# Patient Record
Sex: MALE | Race: WHITE | Employment: OTHER | URBAN - METROPOLITAN AREA
[De-identification: names, ages, dates, MRNs, and addresses within clinical notes are randomized per-mention and may not be internally consistent; named-entity substitution may affect disease eponyms.]

---

## 2017-04-14 ENCOUNTER — HOSPITAL ENCOUNTER (INPATIENT)
Age: 57
LOS: 3 days | Discharge: HOME HEALTH | DRG: 247 | End: 2017-04-17
Attending: EMERGENCY MEDICINE | Admitting: SPECIALIST
Payer: COMMERCIAL

## 2017-04-14 DIAGNOSIS — I21.11 ST ELEVATION MYOCARDIAL INFARCTION INVOLVING RIGHT CORONARY ARTERY (HCC): Primary | ICD-10-CM

## 2017-04-14 PROBLEM — I21.3 STEMI (ST ELEVATION MYOCARDIAL INFARCTION) (HCC): Status: ACTIVE | Noted: 2017-04-14

## 2017-04-14 PROBLEM — I21.9 MYOCARDIAL INFARCTION (HCC): Status: ACTIVE | Noted: 2017-04-14

## 2017-04-14 LAB
ALBUMIN SERPL BCP-MCNC: 4 G/DL (ref 3.5–5)
ALBUMIN/GLOB SERPL: 1.1 {RATIO} (ref 1.1–2.2)
ALP SERPL-CCNC: 79 U/L (ref 45–117)
ALT SERPL-CCNC: 46 U/L (ref 12–78)
ANION GAP BLD CALC-SCNC: 12 MMOL/L (ref 5–15)
ANION GAP BLD CALC-SCNC: 25 MMOL/L (ref 5–15)
AST SERPL W P-5'-P-CCNC: 32 U/L (ref 15–37)
BASOPHILS # BLD AUTO: 0 K/UL (ref 0–0.1)
BASOPHILS # BLD: 0 % (ref 0–1)
BILIRUB SERPL-MCNC: 0.3 MG/DL (ref 0.2–1)
BUN BLD-MCNC: 14 MG/DL (ref 9–20)
BUN SERPL-MCNC: 13 MG/DL (ref 6–20)
BUN/CREAT SERPL: 7 (ref 12–20)
CA-I BLD-MCNC: 1.15 MMOL/L (ref 1.12–1.32)
CALCIUM SERPL-MCNC: 9.3 MG/DL (ref 8.5–10.1)
CHLORIDE BLD-SCNC: 105 MMOL/L (ref 98–107)
CHLORIDE SERPL-SCNC: 107 MMOL/L (ref 97–108)
CK MB CFR SERPL CALC: 1.4 % (ref 0–2.5)
CK MB SERPL-MCNC: 2.7 NG/ML (ref 5–25)
CK SERPL-CCNC: 192 U/L (ref 39–308)
CO2 BLD-SCNC: 17 MMOL/L (ref 21–32)
CO2 SERPL-SCNC: 19 MMOL/L (ref 21–32)
CREAT BLD-MCNC: 1.5 MG/DL (ref 0.6–1.3)
CREAT SERPL-MCNC: 1.76 MG/DL (ref 0.7–1.3)
EOSINOPHIL # BLD: 0.2 K/UL (ref 0–0.4)
EOSINOPHIL NFR BLD: 1 % (ref 0–7)
ERYTHROCYTE [DISTWIDTH] IN BLOOD BY AUTOMATED COUNT: 13.4 % (ref 11.5–14.5)
GLOBULIN SER CALC-MCNC: 3.6 G/DL (ref 2–4)
GLUCOSE BLD STRIP.AUTO-MCNC: 198 MG/DL (ref 65–100)
GLUCOSE BLD-MCNC: 177 MG/DL (ref 65–100)
GLUCOSE SERPL-MCNC: 177 MG/DL (ref 65–100)
HCT VFR BLD AUTO: 48.3 % (ref 36.6–50.3)
HCT VFR BLD CALC: 52 % (ref 36.6–50.3)
HGB BLD-MCNC: 16.8 G/DL (ref 12.1–17)
HGB BLD-MCNC: 17.7 GM/DL (ref 12.1–17)
LYMPHOCYTES # BLD AUTO: 32 % (ref 12–49)
LYMPHOCYTES # BLD: 4.1 K/UL (ref 0.8–3.5)
MCH RBC QN AUTO: 32.9 PG (ref 26–34)
MCHC RBC AUTO-ENTMCNC: 34.8 G/DL (ref 30–36.5)
MCV RBC AUTO: 94.7 FL (ref 80–99)
MONOCYTES # BLD: 1 K/UL (ref 0–1)
MONOCYTES NFR BLD AUTO: 7 % (ref 5–13)
NEUTS SEG # BLD: 7.8 K/UL (ref 1.8–8)
NEUTS SEG NFR BLD AUTO: 60 % (ref 32–75)
PLATELET # BLD AUTO: 246 K/UL (ref 150–400)
POTASSIUM BLD-SCNC: 3.6 MMOL/L (ref 3.5–5.1)
POTASSIUM SERPL-SCNC: 3.5 MMOL/L (ref 3.5–5.1)
PROT SERPL-MCNC: 7.6 G/DL (ref 6.4–8.2)
RBC # BLD AUTO: 5.1 M/UL (ref 4.1–5.7)
SERVICE CMNT-IMP: ABNORMAL
SERVICE CMNT-IMP: ABNORMAL
SODIUM BLD-SCNC: 142 MMOL/L (ref 136–145)
SODIUM SERPL-SCNC: 138 MMOL/L (ref 136–145)
TROPONIN I BLD-MCNC: <0.04 NG/ML (ref 0–0.08)
TROPONIN I SERPL-MCNC: <0.04 NG/ML
WBC # BLD AUTO: 13.1 K/UL (ref 4.1–11.1)

## 2017-04-14 PROCEDURE — 96375 TX/PRO/DX INJ NEW DRUG ADDON: CPT

## 2017-04-14 PROCEDURE — 96365 THER/PROPH/DIAG IV INF INIT: CPT

## 2017-04-14 PROCEDURE — 74011250637 HC RX REV CODE- 250/637: Performed by: SPECIALIST

## 2017-04-14 PROCEDURE — 74011250636 HC RX REV CODE- 250/636

## 2017-04-14 PROCEDURE — 5A1223Z PERFORMANCE OF CARDIAC PACING, CONTINUOUS: ICD-10-PCS | Performed by: SPECIALIST

## 2017-04-14 PROCEDURE — 36415 COLL VENOUS BLD VENIPUNCTURE: CPT | Performed by: EMERGENCY MEDICINE

## 2017-04-14 PROCEDURE — 93005 ELECTROCARDIOGRAM TRACING: CPT

## 2017-04-14 PROCEDURE — 96368 THER/DIAG CONCURRENT INF: CPT

## 2017-04-14 PROCEDURE — 96372 THER/PROPH/DIAG INJ SC/IM: CPT

## 2017-04-14 PROCEDURE — 84484 ASSAY OF TROPONIN QUANT: CPT | Performed by: EMERGENCY MEDICINE

## 2017-04-14 PROCEDURE — B2111ZZ FLUOROSCOPY OF MULTIPLE CORONARY ARTERIES USING LOW OSMOLAR CONTRAST: ICD-10-PCS | Performed by: SPECIALIST

## 2017-04-14 PROCEDURE — 74011000250 HC RX REV CODE- 250

## 2017-04-14 PROCEDURE — 74011636320 HC RX REV CODE- 636/320: Performed by: SPECIALIST

## 2017-04-14 PROCEDURE — 82962 GLUCOSE BLOOD TEST: CPT

## 2017-04-14 PROCEDURE — 74011250636 HC RX REV CODE- 250/636: Performed by: SPECIALIST

## 2017-04-14 PROCEDURE — 4A023N7 MEASUREMENT OF CARDIAC SAMPLING AND PRESSURE, LEFT HEART, PERCUTANEOUS APPROACH: ICD-10-PCS | Performed by: SPECIALIST

## 2017-04-14 PROCEDURE — B2151ZZ FLUOROSCOPY OF LEFT HEART USING LOW OSMOLAR CONTRAST: ICD-10-PCS | Performed by: SPECIALIST

## 2017-04-14 PROCEDURE — 74011250636 HC RX REV CODE- 250/636: Performed by: EMERGENCY MEDICINE

## 2017-04-14 PROCEDURE — 99285 EMERGENCY DEPT VISIT HI MDM: CPT

## 2017-04-14 PROCEDURE — 74011000258 HC RX REV CODE- 258: Performed by: SPECIALIST

## 2017-04-14 PROCEDURE — 92941 PRQ TRLML REVSC TOT OCCL AMI: CPT

## 2017-04-14 PROCEDURE — 82550 ASSAY OF CK (CPK): CPT | Performed by: EMERGENCY MEDICINE

## 2017-04-14 PROCEDURE — 80053 COMPREHEN METABOLIC PANEL: CPT | Performed by: EMERGENCY MEDICINE

## 2017-04-14 PROCEDURE — 80047 BASIC METABLC PNL IONIZED CA: CPT

## 2017-04-14 PROCEDURE — 74011000250 HC RX REV CODE- 250: Performed by: SPECIALIST

## 2017-04-14 PROCEDURE — 027035Z DILATION OF CORONARY ARTERY, ONE ARTERY WITH TWO DRUG-ELUTING INTRALUMINAL DEVICES, PERCUTANEOUS APPROACH: ICD-10-PCS | Performed by: SPECIALIST

## 2017-04-14 PROCEDURE — 96361 HYDRATE IV INFUSION ADD-ON: CPT

## 2017-04-14 PROCEDURE — 85025 COMPLETE CBC W/AUTO DIFF WBC: CPT | Performed by: EMERGENCY MEDICINE

## 2017-04-14 PROCEDURE — 65620000000 HC RM CCU GENERAL

## 2017-04-14 RX ORDER — SODIUM CHLORIDE 9 MG/ML
25-100 INJECTION, SOLUTION INTRAVENOUS CONTINUOUS
Status: DISCONTINUED | OUTPATIENT
Start: 2017-04-14 | End: 2017-04-14 | Stop reason: HOSPADM

## 2017-04-14 RX ORDER — ATORVASTATIN CALCIUM 10 MG/1
10 TABLET, FILM COATED ORAL
Status: DISCONTINUED | OUTPATIENT
Start: 2017-04-14 | End: 2017-04-17 | Stop reason: HOSPADM

## 2017-04-14 RX ORDER — OMEGA-3-ACID ETHYL ESTERS 1 G/1
2 CAPSULE, LIQUID FILLED ORAL 2 TIMES DAILY
COMMUNITY

## 2017-04-14 RX ORDER — FLUMAZENIL 0.1 MG/ML
INJECTION INTRAVENOUS
Status: COMPLETED
Start: 2017-04-14 | End: 2017-04-14

## 2017-04-14 RX ORDER — PRASUGREL 10 MG/1
10-60 TABLET, FILM COATED ORAL
Status: DISCONTINUED | OUTPATIENT
Start: 2017-04-14 | End: 2017-04-14 | Stop reason: HOSPADM

## 2017-04-14 RX ORDER — DOPAMINE HYDROCHLORIDE 320 MG/100ML
5-20 INJECTION, SOLUTION INTRAVENOUS
Status: DISCONTINUED | OUTPATIENT
Start: 2017-04-14 | End: 2017-04-17

## 2017-04-14 RX ORDER — METOPROLOL TARTRATE 25 MG/1
12.5 TABLET, FILM COATED ORAL 2 TIMES DAILY
Status: DISCONTINUED | OUTPATIENT
Start: 2017-04-15 | End: 2017-04-15

## 2017-04-14 RX ORDER — AMITRIPTYLINE HYDROCHLORIDE 75 MG/1
150 TABLET, FILM COATED ORAL
COMMUNITY

## 2017-04-14 RX ORDER — SODIUM CHLORIDE 0.9 % (FLUSH) 0.9 %
5-10 SYRINGE (ML) INJECTION AS NEEDED
Status: DISCONTINUED | OUTPATIENT
Start: 2017-04-14 | End: 2017-04-14 | Stop reason: HOSPADM

## 2017-04-14 RX ORDER — FENTANYL CITRATE 50 UG/ML
25-200 INJECTION, SOLUTION INTRAMUSCULAR; INTRAVENOUS
Status: DISCONTINUED | OUTPATIENT
Start: 2017-04-14 | End: 2017-04-14 | Stop reason: HOSPADM

## 2017-04-14 RX ORDER — MORPHINE SULFATE 2 MG/ML
INJECTION, SOLUTION INTRAMUSCULAR; INTRAVENOUS
Status: DISPENSED
Start: 2017-04-14 | End: 2017-04-15

## 2017-04-14 RX ORDER — ZOLPIDEM TARTRATE 5 MG/1
10 TABLET ORAL
Status: DISCONTINUED | OUTPATIENT
Start: 2017-04-14 | End: 2017-04-17 | Stop reason: HOSPADM

## 2017-04-14 RX ORDER — ACETAMINOPHEN 325 MG/1
650 TABLET ORAL
Status: DISCONTINUED | OUTPATIENT
Start: 2017-04-14 | End: 2017-04-17 | Stop reason: HOSPADM

## 2017-04-14 RX ORDER — LISINOPRIL 2.5 MG/1
2.5 TABLET ORAL DAILY
COMMUNITY
End: 2017-04-17

## 2017-04-14 RX ORDER — HEPARIN SODIUM 200 [USP'U]/100ML
2000 INJECTION, SOLUTION INTRAVENOUS AS NEEDED
Status: DISCONTINUED | OUTPATIENT
Start: 2017-04-14 | End: 2017-04-14 | Stop reason: HOSPADM

## 2017-04-14 RX ORDER — MIDAZOLAM HYDROCHLORIDE 1 MG/ML
.5-1 INJECTION, SOLUTION INTRAMUSCULAR; INTRAVENOUS
Status: DISCONTINUED | OUTPATIENT
Start: 2017-04-14 | End: 2017-04-14 | Stop reason: HOSPADM

## 2017-04-14 RX ORDER — METFORMIN HYDROCHLORIDE 1000 MG/1
1000 TABLET ORAL 2 TIMES DAILY WITH MEALS
COMMUNITY
End: 2017-04-17

## 2017-04-14 RX ORDER — INSULIN ASPART 100 [IU]/ML
10 INJECTION, SOLUTION INTRAVENOUS; SUBCUTANEOUS
COMMUNITY
End: 2019-10-31

## 2017-04-14 RX ORDER — GUAIFENESIN 100 MG/5ML
81 LIQUID (ML) ORAL DAILY
Status: DISCONTINUED | OUTPATIENT
Start: 2017-04-15 | End: 2017-04-17 | Stop reason: HOSPADM

## 2017-04-14 RX ORDER — SODIUM CHLORIDE 0.9 % (FLUSH) 0.9 %
5-10 SYRINGE (ML) INJECTION AS NEEDED
Status: DISCONTINUED | OUTPATIENT
Start: 2017-04-14 | End: 2017-04-17 | Stop reason: HOSPADM

## 2017-04-14 RX ORDER — LIDOCAINE HYDROCHLORIDE 10 MG/ML
10-30 INJECTION INFILTRATION; PERINEURAL
Status: DISCONTINUED | OUTPATIENT
Start: 2017-04-14 | End: 2017-04-14 | Stop reason: HOSPADM

## 2017-04-14 RX ORDER — DEXTROSE 50 % IN WATER (D50W) INTRAVENOUS SYRINGE
12.5-25 AS NEEDED
Status: DISCONTINUED | OUTPATIENT
Start: 2017-04-14 | End: 2017-04-17 | Stop reason: HOSPADM

## 2017-04-14 RX ORDER — PHENYLEPHRINE HCL IN 0.9% NACL 0.4MG/10ML
100-400 SYRINGE (ML) INTRAVENOUS ONCE
Status: COMPLETED | OUTPATIENT
Start: 2017-04-14 | End: 2017-04-14

## 2017-04-14 RX ORDER — SODIUM CHLORIDE 0.9 % (FLUSH) 0.9 %
5-10 SYRINGE (ML) INJECTION EVERY 8 HOURS
Status: DISCONTINUED | OUTPATIENT
Start: 2017-04-14 | End: 2017-04-17 | Stop reason: HOSPADM

## 2017-04-14 RX ORDER — HEPARIN SODIUM 1000 [USP'U]/ML
1000-5000 INJECTION, SOLUTION INTRAVENOUS; SUBCUTANEOUS
Status: DISCONTINUED | OUTPATIENT
Start: 2017-04-14 | End: 2017-04-14 | Stop reason: HOSPADM

## 2017-04-14 RX ORDER — PRASUGREL 10 MG/1
10 TABLET, FILM COATED ORAL DAILY
Status: DISCONTINUED | OUTPATIENT
Start: 2017-04-15 | End: 2017-04-17 | Stop reason: HOSPADM

## 2017-04-14 RX ORDER — EPTIFIBATIDE 0.75 MG/ML
2 INJECTION, SOLUTION INTRAVENOUS AS NEEDED
Status: DISCONTINUED | OUTPATIENT
Start: 2017-04-14 | End: 2017-04-14 | Stop reason: HOSPADM

## 2017-04-14 RX ORDER — FENOFIBRATE 200 MG/1
200 CAPSULE ORAL DAILY
COMMUNITY
End: 2017-04-17

## 2017-04-14 RX ORDER — SODIUM CHLORIDE 9 MG/ML
100 INJECTION, SOLUTION INTRAVENOUS CONTINUOUS
Status: DISPENSED | OUTPATIENT
Start: 2017-04-14 | End: 2017-04-15

## 2017-04-14 RX ORDER — INSULIN LISPRO 100 [IU]/ML
INJECTION, SOLUTION INTRAVENOUS; SUBCUTANEOUS
Status: DISCONTINUED | OUTPATIENT
Start: 2017-04-14 | End: 2017-04-17 | Stop reason: HOSPADM

## 2017-04-14 RX ORDER — ATROPINE SULFATE 0.1 MG/ML
.5-1 INJECTION INTRAVENOUS AS NEEDED
Status: DISCONTINUED | OUTPATIENT
Start: 2017-04-14 | End: 2017-04-14 | Stop reason: HOSPADM

## 2017-04-14 RX ORDER — MAGNESIUM SULFATE 100 %
4 CRYSTALS MISCELLANEOUS AS NEEDED
Status: DISCONTINUED | OUTPATIENT
Start: 2017-04-14 | End: 2017-04-17 | Stop reason: HOSPADM

## 2017-04-14 RX ORDER — FENTANYL CITRATE 50 UG/ML
50 INJECTION, SOLUTION INTRAMUSCULAR; INTRAVENOUS
Status: DISCONTINUED | OUTPATIENT
Start: 2017-04-14 | End: 2017-04-17 | Stop reason: HOSPADM

## 2017-04-14 RX ADMIN — ATORVASTATIN CALCIUM 10 MG: 10 TABLET, FILM COATED ORAL at 22:29

## 2017-04-14 RX ADMIN — SODIUM CHLORIDE 100 ML/HR: 900 INJECTION, SOLUTION INTRAVENOUS at 21:32

## 2017-04-14 RX ADMIN — FLUMAZENIL 5 MG: 0.1 INJECTION, SOLUTION INTRAVENOUS at 20:50

## 2017-04-14 RX ADMIN — EPTIFIBATIDE 2 MCG/KG/MIN: 0.75 INJECTION, SOLUTION INTRAVENOUS at 20:36

## 2017-04-14 RX ADMIN — MIDAZOLAM HYDROCHLORIDE 2 MG: 1 INJECTION, SOLUTION INTRAMUSCULAR; INTRAVENOUS at 20:03

## 2017-04-14 RX ADMIN — SODIUM CHLORIDE 4000 ML: 900 INJECTION, SOLUTION INTRAVENOUS at 19:20

## 2017-04-14 RX ADMIN — IOPAMIDOL 50 ML: 755 INJECTION, SOLUTION INTRAVENOUS at 20:18

## 2017-04-14 RX ADMIN — EPTIFIBATIDE 2 MCG/KG/MIN: 0.75 INJECTION, SOLUTION INTRAVENOUS at 20:24

## 2017-04-14 RX ADMIN — SODIUM CHLORIDE 25 ML/HR: 900 INJECTION, SOLUTION INTRAVENOUS at 21:31

## 2017-04-14 RX ADMIN — Medication 60 MG: at 20:52

## 2017-04-14 RX ADMIN — ATROPINE SULFATE 1 MG: 0.1 INJECTION, SOLUTION ENDOTRACHEAL; INTRAMUSCULAR; INTRAVENOUS; SUBCUTANEOUS at 19:57

## 2017-04-14 RX ADMIN — PHENYLEPHRINE HYDROCHLORIDE 200 MCG: 10 INJECTION, SOLUTION INTRAMUSCULAR; INTRAVENOUS; SUBCUTANEOUS at 20:15

## 2017-04-14 RX ADMIN — LIDOCAINE HYDROCHLORIDE 10 ML: 10 INJECTION, SOLUTION INFILTRATION; PERINEURAL at 20:01

## 2017-04-14 RX ADMIN — MIDAZOLAM HYDROCHLORIDE 2 MG: 1 INJECTION, SOLUTION INTRAMUSCULAR; INTRAVENOUS at 19:58

## 2017-04-14 RX ADMIN — Medication 10 ML: at 20:53

## 2017-04-14 RX ADMIN — DOPAMINE HYDROCHLORIDE IN DEXTROSE 18.56 MCG/KG/MIN: 3.2 INJECTION, SOLUTION INTRAVENOUS at 20:02

## 2017-04-14 RX ADMIN — FENTANYL CITRATE 50 MCG: 50 INJECTION, SOLUTION INTRAMUSCULAR; INTRAVENOUS at 20:53

## 2017-04-14 RX ADMIN — DOPAMINE HYDROCHLORIDE IN DEXTROSE 5 MCG/KG/MIN: 3.2 INJECTION, SOLUTION INTRAVENOUS at 19:38

## 2017-04-14 RX ADMIN — HEPARIN SODIUM IN SODIUM CHLORIDE 2000 UNITS: 200 INJECTION INTRAVENOUS at 20:51

## 2017-04-14 RX ADMIN — IOPAMIDOL 164 ML: 755 INJECTION, SOLUTION INTRAVENOUS at 20:56

## 2017-04-14 RX ADMIN — BIVALIRUDIN 150.85 MG/HR: 250 INJECTION, POWDER, LYOPHILIZED, FOR SOLUTION INTRAVENOUS at 20:16

## 2017-04-14 NOTE — ED PROVIDER NOTES
HPI Comments: 60 y.o. male with no significant past medical history who presents via EMS with chief complaint of chest pain. EMS personnel report picking up the patient from his home for a complaint of chest pain with onset at ~1830 \"this evening. \" They report giving the patient \"four\" Aspirin (81 mg) en route to the ED. They report the patient's systolic blood pressure was \"in the 60s. \" Patient reports chest pain, SOB, nausea, dizziness, shoulder pain, and arm pain with onset at ~1830. Patient reports his last meal was at ~1330 \"this afternoon. \" Patient reports working out at the gym and then going home; reports his symptoms began after going home. Patient denies being on Aspirin regularly. Patient denies any allergies to medications. Patient denies vomiting, numbness, and weakness. There are no other acute medical concerns at this time. PCP: Adam Schulz MD    Note written by Georgi Goodman, as dictated by Sabine Cruz MD 7:51 PM     The history is provided by the patient and the EMS personnel. No past medical history on file. No past surgical history on file. No family history on file. Social History     Social History    Marital status:      Spouse name: N/A    Number of children: N/A    Years of education: N/A     Occupational History    Not on file. Social History Main Topics    Smoking status: Not on file    Smokeless tobacco: Not on file    Alcohol use Not on file    Drug use: Not on file    Sexual activity: Not on file     Other Topics Concern    Not on file     Social History Narrative         ALLERGIES: Review of patient's allergies indicates not on file. Review of Systems   Constitutional: Negative for chills, diaphoresis and fever. HENT: Negative for congestion, postnasal drip, rhinorrhea and sore throat. Eyes: Negative for photophobia, discharge, redness and visual disturbance. Respiratory: Positive for shortness of breath.  Negative for cough, chest tightness and wheezing. Cardiovascular: Positive for chest pain. Negative for palpitations and leg swelling. Gastrointestinal: Positive for nausea. Negative for abdominal distention, abdominal pain, blood in stool, constipation, diarrhea and vomiting. Genitourinary: Negative for difficulty urinating, dysuria, frequency, hematuria and urgency. Musculoskeletal: Positive for myalgias. Negative for arthralgias, back pain and joint swelling. Skin: Negative for color change and rash. Neurological: Positive for dizziness. Negative for speech difficulty, weakness, light-headedness, numbness and headaches. Psychiatric/Behavioral: Negative for confusion. The patient is not nervous/anxious. All other systems reviewed and are negative. Vitals:    04/14/17 1933 04/14/17 1935 04/14/17 2048 04/14/17 2100   BP: 90/61 97/67 (!) 85/51 95/57   Pulse: (!) 42 (!) 41 (!) 106 (!) 104   Resp: 23 20 20 20   SpO2: (!) 69% 99% 97% 97%   Weight:       Height:                Physical Exam   Constitutional: He is oriented to person, place, and time. He appears well-developed and well-nourished. No distress. Appears ill; diaphoretic, but in no apparent distress. HENT:   Head: Normocephalic and atraumatic. Right Ear: External ear normal.   Left Ear: External ear normal.   Nose: Nose normal.   Mouth/Throat: Oropharynx is clear and moist.   Eyes: Conjunctivae and EOM are normal. Pupils are equal, round, and reactive to light. No scleral icterus. Neck: Normal range of motion. Neck supple. No JVD present. No tracheal deviation present. No thyromegaly present. Cardiovascular: Regular rhythm and normal heart sounds. Exam reveals no gallop and no friction rub. No murmur heard. Bradycardic. Pulmonary/Chest: Effort normal and breath sounds normal. No respiratory distress. He has no wheezes. He has no rales. He exhibits no tenderness. Abdominal: Soft.  Bowel sounds are normal. He exhibits no distension and no mass. There is no tenderness. There is no rebound and no guarding. Musculoskeletal: Normal range of motion. He exhibits no edema or tenderness. Lymphadenopathy:     He has no cervical adenopathy. Neurological: He is alert and oriented to person, place, and time. He has normal strength. He displays no atrophy and no tremor. No cranial nerve deficit. He exhibits normal muscle tone. Coordination and gait normal.   Skin: Skin is warm. No rash noted. He is diaphoretic. No erythema. Psychiatric: He has a normal mood and affect. His behavior is normal. Judgment and thought content normal.   Nursing note and vitals reviewed. Note written by Georgi Enamorado, as dictated by Navarro Phan MD 8:11 PM      MDM  Number of Diagnoses or Management Options  Diagnosis management comments: MICHELLE  Impression:  57-year-old male presenting to the emergency department with substernal chest pain and a STEMI alert from the field. I reviewed his electrocardiogram which shows an acute inferior wall infarct with ST segment elevation in leads V5 and V6, right-sided EKG was done which revealed ST segment elevation in V4 through V6 right-sided leads. The patient is hypotensive and bradycardic and indeed he is in a third degree heart block. Plan of care will be IV fluids, external pacemaker, we'll augment with dopamine, and await the cardiologist for the cardiac catheterization lab. Critical Care  Total time providing critical care: (Total critical care time spend exclusive of procedures: 45 minutes  )    ED Course     Procedures    ED EKG Interpretation:  HR in the 40s; ST segment elevation in the inferior leads and in V5 and V6. Note written by Georgi Enamorado, as dictated by Navarro Phan MD 8:13 PM    ED EKG Interpretation - Right-Sided:  ST segment elevation in leads V4, V5, and somewhat into V6.    Note written by Georgi Enamorado, as dictated by Navarro Phan MD 8:13 PM

## 2017-04-14 NOTE — ED NOTES
Able to get in touch with wife; notified her of patient's status and being in critical condition; she is on route to hospital at this time

## 2017-04-14 NOTE — IP AVS SNAPSHOT
Current Discharge Medication List  
  
START taking these medications Dose & Instructions Dispensing Information Comments Morning Noon Evening Bedtime  
 aspirin 81 mg chewable tablet Your last dose was: Your next dose is:    
   
   
 Dose:  81 mg Take 1 Tab by mouth daily. Quantity:  90 Tab Refills:  3  
     
   
   
   
  
 atorvastatin 10 mg tablet Commonly known as:  LIPITOR Your last dose was: Your next dose is:    
   
   
 Dose:  10 mg Take 1 Tab by mouth nightly. Quantity:  30 Tab Refills:  11  
     
   
   
   
  
 carvedilol 25 mg tablet Commonly known as:  Render Blonder Your last dose was: Your next dose is:    
   
   
 Dose:  25 mg Take 1 Tab by mouth two (2) times a day. Quantity:  60 Tab Refills:  11  
     
   
   
   
  
 lisinopril-hydroCHLOROthiazide 10-12.5 mg per tablet Commonly known as:  Mahnaz Maribell Your last dose was: Your next dose is:    
   
   
 Dose:  1 Tab Take 1 Tab by mouth daily. Quantity:  30 Tab Refills:  11  
     
   
   
   
  
 prasugrel 10 mg tablet Commonly known as:  EFFIENT Your last dose was: Your next dose is:    
   
   
 Dose:  10 mg Take 1 Tab by mouth daily. Quantity:  30 Tab Refills:  1 CONTINUE these medications which have NOT CHANGED Dose & Instructions Dispensing Information Comments Morning Noon Evening Bedtime  
 amitriptyline 75 mg tablet Commonly known as:  ELAVIL Your last dose was: Your next dose is:    
   
   
 Dose:  150 mg Take 150 mg by mouth nightly. Refills:  0 LEVEMIR FLEXPEN 100 unit/mL (3 mL) Inpn Generic drug:  insulin detemir Your last dose was: Your next dose is:    
   
   
 Dose:  40 Units 40 Units by SubCUTAneous route Daily (before breakfast). Refills:  0 NovoLOG Flexpen 100 unit/mL Inpn Generic drug:  insulin aspart Your last dose was: Your next dose is:    
   
   
 Dose:  8 Units 8 Units by SubCUTAneous route Before breakfast, lunch, and dinner. Refills:  0  
     
   
   
   
  
 omega-3 acid ethyl esters 1 gram capsule Commonly known as:  Remus Albe Your last dose was: Your next dose is:    
   
   
 Dose:  2 g Take 2 g by mouth two (2) times a day. Refills:  0 STOP taking these medications   
 fenofibrate micronized 200 mg capsule Commonly known as:  LOFIBRA  
   
  
 lisinopril 2.5 mg tablet Commonly known as:  PRINIVIL, ZESTRIL  
   
  
 metFORMIN 1,000 mg tablet Commonly known as:  GLUCOPHAGE Where to Get Your Medications These medications were sent to [place] [address], [address] [address] Phone:  [phone]  
  aspirin 81 mg chewable tablet  
 atorvastatin 10 mg tablet  
 carvedilol 25 mg tablet  
 lisinopril-hydroCHLOROthiazide 10-12.5 mg per tablet  
 prasugrel 10 mg tablet

## 2017-04-14 NOTE — PROGRESS NOTES
Cardiac Cath Lab Procedure Area Arrival Note:    Keven Dwyer arrived to Cardiac Cath Lab, Procedure Area. Patient identifiers verified with NAME and DATE OF BIRTH. Procedure verified with patient. Consent forms verified. Allergies verified. Patient informed of procedure and plan of care. Questions answered with review. Patient voiced understanding of procedure and plan of care. Patient on cardiac monitor, non-invasive blood pressure, SPO2 monitor. On O2 @ 10 lpm via 100%nrb . IV of normal saline on pump at 999 ml/hr. Patient status doing well without problems. Patient is A&Ox 4. Patient reports chest pain. Patient medicated during procedure with orders obtained and verified by Dr. Dorie Gonzalez. Refer to patients Cardiac Cath Lab PROCEDURE REPORT for vital signs, assessment, status, and response during procedure, printed at end of case. Printed report on chart or scanned into chart.

## 2017-04-14 NOTE — IP AVS SNAPSHOT
[address] 
[phone] Patient: Shawn Queen MRN: [id number] :1960 You are allergic to the following Allergen Reactions Fentanyl Anxiety Patient developed severe agitation and anxiety after administration during a cardiac cath. Recent Documentation Height Weight BMI Smoking Status 1.702 m 87.4 kg 30.18 kg/m2 Unknown If Ever Smoked Emergency Contacts Name Discharge Info Relation Home Work Mobile BreeSalma DISCHARGE CAREGIVER [3] Spouse [3] About your hospitalization You were admitted on:  2017 You last received care in the:  [facility] CV SERVICES UNIT You were discharged on:  2017 Unit phone number:  [phone] Why you were hospitalized Your primary diagnosis was:  Not on File Your diagnoses also included:  Myocardial Infarction (Hcc), Stemi (St Elevation Myocardial Infarction) (Hcc), Pericardial Effusion, Essential Hypertension Providers Seen During Your Hospitalizations Provider Role Specialty Primary office phone Linda Rock MD Attending Provider Emergency Medicine [phone] Karolyn Holguin MD Attending Provider Cardiology [phone] Your Primary Care Physician (PCP) Primary Care Physician Office Phone Office Fax Jonny Umana [phone] Follow-up Information Follow up With Details Comments Contact Info Smoking Cessation resource   please visit smoking cessation: 
 
[email or web address] Karolyn Holguin MD On 2017 2:40 pm [address] [place] [address] 
[phone] Kirk Beckham MD  Follow up for evaluation for TRIPP , incidental finding of left adrenal adenoma (benign) and post hospitalization followup [facility] [address] [place] [address] 
[phone] Your Appointments Friday April 21, 2017  2:40 PM EDT New Patient with Sylvia Grayson MD  
[facility] ([address]) [address]  [address] [place] [address]  
[phone] Current Discharge Medication List  
  
START taking these medications Dose & Instructions Dispensing Information Comments Morning Noon Evening Bedtime  
 aspirin 81 mg chewable tablet Your last dose was: Your next dose is:    
   
   
 Dose:  81 mg Take 1 Tab by mouth daily. Quantity:  90 Tab Refills:  3  
     
   
   
   
  
 atorvastatin 10 mg tablet Commonly known as:  LIPITOR Your last dose was: Your next dose is:    
   
   
 Dose:  10 mg Take 1 Tab by mouth nightly. Quantity:  30 Tab Refills:  11  
     
   
   
   
  
 carvedilol 25 mg tablet Commonly known as:  Esaw Math Your last dose was: Your next dose is:    
   
   
 Dose:  25 mg Take 1 Tab by mouth two (2) times a day. Quantity:  60 Tab Refills:  11  
     
   
   
   
  
 lisinopril-hydroCHLOROthiazide 10-12.5 mg per tablet Commonly known as:  Valplacido Rachelle Your last dose was: Your next dose is:    
   
   
 Dose:  1 Tab Take 1 Tab by mouth daily. Quantity:  30 Tab Refills:  11  
     
   
   
   
  
 prasugrel 10 mg tablet Commonly known as:  EFFIENT Your last dose was: Your next dose is:    
   
   
 Dose:  10 mg Take 1 Tab by mouth daily. Quantity:  30 Tab Refills:  1 CONTINUE these medications which have NOT CHANGED Dose & Instructions Dispensing Information Comments Morning Noon Evening Bedtime  
 amitriptyline 75 mg tablet Commonly known as:  ELAVIL Your last dose was: Your next dose is:    
   
   
 Dose:  150 mg Take 150 mg by mouth nightly. Refills:  0 LEVEMIR FLEXPEN 100 unit/mL (3 mL) Inpn Generic drug:  insulin detemir Your last dose was: Your next dose is:    
   
   
 Dose:  40 Units 40 Units by SubCUTAneous route Daily (before breakfast). Refills:  0 NovoLOG Flexpen 100 unit/mL Inpn Generic drug:  insulin aspart Your last dose was: Your next dose is:    
   
   
 Dose:  8 Units 8 Units by SubCUTAneous route Before breakfast, lunch, and dinner. Refills:  0  
     
   
   
   
  
 omega-3 acid ethyl esters 1 gram capsule Commonly known as:  Aixa Peter Your last dose was: Your next dose is:    
   
   
 Dose:  2 g Take 2 g by mouth two (2) times a day. Refills:  0 STOP taking these medications   
 fenofibrate micronized 200 mg capsule Commonly known as:  LOFIBRA  
   
  
 lisinopril 2.5 mg tablet Commonly known as:  PRINIVIL, ZESTRIL  
   
  
 metFORMIN 1,000 mg tablet Commonly known as:  GLUCOPHAGE Where to Get Your Medications These medications were sent to [place] [address], [address] [place] [id number] Phone:  [phone]  
  aspirin 81 mg chewable tablet  
 atorvastatin 10 mg tablet  
 carvedilol 25 mg tablet  
 lisinopril-hydroCHLOROthiazide 10-12.5 mg per tablet  
 prasugrel 10 mg tablet Discharge Instructions PLEASE DISCUSS with DR. Brennon Chang at NEXT visit- plan for duration of EFFIENT. Please do not stop taking EFFIENT without speaking to your cardiologist first.  Amarilys Diggs have been prescribed a blood thinner to prevent the development of blood clots. Please notify your cardiologist immediately if you notice blood in your urine or stool, have dark stools, have significant nosebleeds or notice any other unusual bleeding or bruising. Patient Discharge Instructions Ban Renae / [id number] : 1960 Admitted 4/14/2017 Discharged: 4/17/2017 CARDIAC CATHETERIZATION/ CATH STENT PLACEMENT  
DISCHARGE INSTRUCTIONS If possible, have someone stay with you for the first night. It is normal to feel tired for the first couple of days. Take it easy and follow your physicians instructions on activity. CHECK THE CATHETER INSERTION SITE DAILY: If bleeding at the cath site occurs, take a clean washcloth and apply direct pressure just above the puncture site for at least 15 minutes. Call 911 immediately if the bleeding is not controlled. Continue to apply direct pressure until an ambulance gets to your location. Do not try to drive yourself or have someone else drive you to the hospital.  Have the ambulance bring you to the emergency room. You may shower 24 hours after your procedure. Gently remove the bandage during showering. Wash with soap and water and pat dry. To prevent infection, keep the groin area/insertion site clean and dry. Do not apply powders, creams, lotions, or ointments to the site for 5 days. You may cover the site with a fresh Band-Aid each day until well healed. You may notice a small lump at the site. This is normal and may last up to 6 weeks. CALL THE PHYSICIAN: 
? If the site becomes red, swollen, or feels warm to the touch, or is healing poorly ? If you note any large/extending bruise, fever >101.0 or chills ? If your extremity has numbness, tingling, discoloration, abnormal swelling, tightness or pain ? If you have difficulty with urination or develop nausea, vomiting, or severe abdominal pain ACTIVITY for the first 24-48 hours, or as instructed by your physician: 
? No lifting, pushing or pulling over 10 pounds and no straining the insertion site. Do not lift grocery bags or the garbage can; do not run the vacuum  or  for 7 days. ? You may start walking short distances the day of your procedure. Gradually increase as tolerated each day. Current activity recommendations are 30 minutes of exercise at least 5 days a week. Work up to this as you recover. ? Avoid walking outside in extremes of heat or cold. Walk inside (at home, at the mall, or at a large store) when it is cold and windy or hot and humid. THINGS TO KEEP IN MIND:  
? Do not drive, operate any machinery, or sign any legal documents for 24 hours after your procedure, or as directed by your physician. You must have someone drive you home after your procedure. ? Drink plenty of fluids for 24-48 hours after your procedure to flush the contrast dye from your kidneys. ? Limit the number of times you go up and down the stairs ? Take rests and pace yourself with activity ? Be careful and do not strain with bowel movements MEDICATIONS: 
? Take all medications as prescribed ? Call your physician if you have any questions ? Keep an updated list of your medications with you at all times and give a list to your primary physician and pharmacist 
? You may use Tylenol 325mg 1-2 tablets every 6 hours as needed for pain or discomfort, unless otherwise instructed. If you have significant discomfort more than 48 hours after your procedure, please call your physicians office. SIGNS AND SYMPTOMS: 
? Notify your physician for new or recurrent symptoms of chest discomfort, unusual shortness of breath or fatigue. These could be signs of a problem and should be discussed with your physician. ? For significant chest pain or symptoms of angina not relieved with rest:  if you have been prescribed Nitroglycerin, take as directed (taken under the tongue, one at a time 5 minutes apart for a total of 3 doses, sitting down). If the discomfort is not relieved after the 3rd Nitroglycerin, call 911. If you have not been prescribed Nitroglycerin and your chest discomfort is significant, call 911.  Take the ambulance, do not try to drive yourself or have someone else drive you to the hospital.  
 
AFTER CARE: 
? Follow up with your physician as instructed ? Follow a heart healthy diet with proper portion control, daily stress management, daily exercise, blood pressure and cholesterol control, and smoking cessation. The success of your stent, if you had one placed, and the prevention of future catheterizations heavily depends on your lifestyle changes you make now! ? You may start walking short distances the day of your procedure. Gradually increase as tolerated each day. Current activity recommendations are 30 minutes of exercise at least 5 days a week. Work up to this as you recover. Walk, ride a bike, or choose any other activity you enjoy to reach this activity goal.  
? Avoid walking outside in extremes of heat or cold. Walk inside (at home, at the mall, or at a large store) when it is cold and windy or hot and humid. ? If you had a stent placed, consider Cardiac Wellness as a resource to help you make the needed lifestyle changes to live a heart healthy lifestyle. Discuss your candidacy with your physician. If you have questions, call your physicians office or the Cardiac Cath Lab at [phone]. The Cath Lab is operational from 6:30 a.m. to 5:00 p.m., Monday through Friday. After hours, notify your physician. [address] [place] [address] can be reached at [phone]. Cardiac Wellness is operational Monday-Thursday 8:30 a.m. to 5:00 p.m. and Friday 8:30 a.m. to 12:00 p.m. Remember:  IN CASE OF BLEEDING: KEEP FIRM PRESSURE ON THE PROCEDURE SITE AND CALL 911 IF NOT CONTROLLED Discharge Orders None [facility] Announcement We are excited to announce that we are making your provider's discharge notes available to you in [employer]. You will see these notes when they are completed and signed by the physician that discharged you from your recent hospital stay.   If you have any questions or concerns about any information you see in CallGrader, please call the Health Information Department where you were seen or reach out to your Primary Care Provider for more information about your plan of care. Introducing [facility]! Vinay Pedro introduces CallGrader patient portal. Now you can access parts of your medical record, email your doctor's office, and request medication refills online. 1. In your internet browser, go to [email or web address] "[email or web address] 2. Click on the First Time User? Click Here link in the Sign In box. You will see the New Member Sign Up page. 3. Enter your CallGrader Access Code exactly as it appears below. You will not need to use this code after youve completed the sign-up process. If you do not sign up before the expiration date, you must request a new code. · CallGrader Access Code: [email or web address] Expires: 7/16/2017 10:40 AM 
 
4. Enter the last four digits of your Social Security Number (xxxx) and Date of Birth (mm/dd/yyyy) as indicated and click Submit. You will be taken to the next sign-up page. 5. Create a CallGrader ID. This will be your CallGrader login ID and cannot be changed, so think of one that is secure and easy to remember. 6. Create a CallGrader password. You can change your password at any time. 7. Enter your Password Reset Question and Answer. This can be used at a later time if you forget your password. 8. Enter your e-mail address. You will receive e-mail notification when new information is available in [address] 9. Click Sign Up. You can now view and download portions of your medical record. 10. Click the Download Summary menu link to download a portable copy of your medical information. If you have questions, please visit the Frequently Asked Questions section of the CallGrader website. Remember, CallGrader is NOT to be used for urgent needs. For medical emergencies, dial 911. Now available from your iPhone and Android! General Information Please provide this summary of care documentation to your next provider. Patient Signature:  ____________________________________________________________ Date:  ____________________________________________________________  
  
Neita Hidden Provider Signature:  ____________________________________________________________ Date:  ____________________________________________________________

## 2017-04-15 ENCOUNTER — APPOINTMENT (OUTPATIENT)
Dept: CT IMAGING | Age: 57
DRG: 247 | End: 2017-04-15
Attending: SPECIALIST
Payer: COMMERCIAL

## 2017-04-15 PROBLEM — I31.39 PERICARDIAL EFFUSION: Status: ACTIVE | Noted: 2017-04-15

## 2017-04-15 LAB
ALBUMIN SERPL BCP-MCNC: 3.2 G/DL (ref 3.5–5)
ALBUMIN/GLOB SERPL: 1.1 {RATIO} (ref 1.1–2.2)
ALP SERPL-CCNC: 59 U/L (ref 45–117)
ALT SERPL-CCNC: 51 U/L (ref 12–78)
ANION GAP BLD CALC-SCNC: 10 MMOL/L (ref 5–15)
AST SERPL W P-5'-P-CCNC: 55 U/L (ref 15–37)
BILIRUB SERPL-MCNC: 0.4 MG/DL (ref 0.2–1)
BNP SERPL-MCNC: 39 PG/ML (ref 0–100)
BUN SERPL-MCNC: 9 MG/DL (ref 6–20)
BUN/CREAT SERPL: 8 (ref 12–20)
CALCIUM SERPL-MCNC: 8.1 MG/DL (ref 8.5–10.1)
CHLORIDE SERPL-SCNC: 109 MMOL/L (ref 97–108)
CHOLEST SERPL-MCNC: 131 MG/DL
CO2 SERPL-SCNC: 22 MMOL/L (ref 21–32)
CREAT SERPL-MCNC: 1.12 MG/DL (ref 0.7–1.3)
ERYTHROCYTE [DISTWIDTH] IN BLOOD BY AUTOMATED COUNT: 13.7 % (ref 11.5–14.5)
EST. AVERAGE GLUCOSE BLD GHB EST-MCNC: 166 MG/DL
GLOBULIN SER CALC-MCNC: 3 G/DL (ref 2–4)
GLUCOSE BLD STRIP.AUTO-MCNC: 110 MG/DL (ref 65–100)
GLUCOSE BLD STRIP.AUTO-MCNC: 140 MG/DL (ref 65–100)
GLUCOSE BLD STRIP.AUTO-MCNC: 161 MG/DL (ref 65–100)
GLUCOSE BLD STRIP.AUTO-MCNC: 99 MG/DL (ref 65–100)
GLUCOSE SERPL-MCNC: 161 MG/DL (ref 65–100)
HBA1C MFR BLD: 7.4 % (ref 4.2–6.3)
HCT VFR BLD AUTO: 41.2 % (ref 36.6–50.3)
HDLC SERPL-MCNC: 19 MG/DL
HDLC SERPL: 6.9 {RATIO} (ref 0–5)
HGB BLD-MCNC: 13.8 G/DL (ref 12.1–17)
LDLC SERPL CALC-MCNC: 70.8 MG/DL (ref 0–100)
LIPID PROFILE,FLP: ABNORMAL
MCH RBC QN AUTO: 31.6 PG (ref 26–34)
MCHC RBC AUTO-ENTMCNC: 33.5 G/DL (ref 30–36.5)
MCV RBC AUTO: 94.3 FL (ref 80–99)
PLATELET # BLD AUTO: 190 K/UL (ref 150–400)
POTASSIUM SERPL-SCNC: 3.7 MMOL/L (ref 3.5–5.1)
PROT SERPL-MCNC: 6.2 G/DL (ref 6.4–8.2)
RBC # BLD AUTO: 4.37 M/UL (ref 4.1–5.7)
SERVICE CMNT-IMP: ABNORMAL
SERVICE CMNT-IMP: NORMAL
SODIUM SERPL-SCNC: 141 MMOL/L (ref 136–145)
TRIGL SERPL-MCNC: 206 MG/DL (ref ?–150)
TROPONIN I SERPL-MCNC: 6.08 NG/ML
VLDLC SERPL CALC-MCNC: 41.2 MG/DL
WBC # BLD AUTO: 14.8 K/UL (ref 4.1–11.1)

## 2017-04-15 PROCEDURE — 65660000000 HC RM CCU STEPDOWN

## 2017-04-15 PROCEDURE — 74011636637 HC RX REV CODE- 636/637: Performed by: INTERNAL MEDICINE

## 2017-04-15 PROCEDURE — 74011636637 HC RX REV CODE- 636/637: Performed by: SPECIALIST

## 2017-04-15 PROCEDURE — 36415 COLL VENOUS BLD VENIPUNCTURE: CPT | Performed by: SPECIALIST

## 2017-04-15 PROCEDURE — 83880 ASSAY OF NATRIURETIC PEPTIDE: CPT | Performed by: SPECIALIST

## 2017-04-15 PROCEDURE — 71250 CT THORAX DX C-: CPT

## 2017-04-15 PROCEDURE — 74011250637 HC RX REV CODE- 250/637: Performed by: INTERNAL MEDICINE

## 2017-04-15 PROCEDURE — 84484 ASSAY OF TROPONIN QUANT: CPT | Performed by: SPECIALIST

## 2017-04-15 PROCEDURE — 74011250637 HC RX REV CODE- 250/637: Performed by: NURSE PRACTITIONER

## 2017-04-15 PROCEDURE — 93005 ELECTROCARDIOGRAM TRACING: CPT

## 2017-04-15 PROCEDURE — 82962 GLUCOSE BLOOD TEST: CPT

## 2017-04-15 PROCEDURE — 74011250637 HC RX REV CODE- 250/637: Performed by: SPECIALIST

## 2017-04-15 PROCEDURE — 80061 LIPID PANEL: CPT | Performed by: SPECIALIST

## 2017-04-15 PROCEDURE — 93306 TTE W/DOPPLER COMPLETE: CPT

## 2017-04-15 PROCEDURE — 83036 HEMOGLOBIN GLYCOSYLATED A1C: CPT | Performed by: INTERNAL MEDICINE

## 2017-04-15 PROCEDURE — 85027 COMPLETE CBC AUTOMATED: CPT | Performed by: SPECIALIST

## 2017-04-15 PROCEDURE — 80053 COMPREHEN METABOLIC PANEL: CPT | Performed by: SPECIALIST

## 2017-04-15 RX ORDER — METOPROLOL TARTRATE 25 MG/1
25 TABLET, FILM COATED ORAL 2 TIMES DAILY
Status: DISCONTINUED | OUTPATIENT
Start: 2017-04-15 | End: 2017-04-15

## 2017-04-15 RX ORDER — AMITRIPTYLINE HYDROCHLORIDE 50 MG/1
150 TABLET, FILM COATED ORAL 2 TIMES DAILY
Status: DISCONTINUED | OUTPATIENT
Start: 2017-04-15 | End: 2017-04-15

## 2017-04-15 RX ORDER — INSULIN GLARGINE 100 [IU]/ML
40 INJECTION, SOLUTION SUBCUTANEOUS DAILY
Status: DISCONTINUED | OUTPATIENT
Start: 2017-04-15 | End: 2017-04-17 | Stop reason: HOSPADM

## 2017-04-15 RX ORDER — LISINOPRIL 5 MG/1
5 TABLET ORAL DAILY
Status: DISCONTINUED | OUTPATIENT
Start: 2017-04-15 | End: 2017-04-16

## 2017-04-15 RX ORDER — IBUPROFEN 200 MG
1 TABLET ORAL DAILY
Status: DISCONTINUED | OUTPATIENT
Start: 2017-04-15 | End: 2017-04-17 | Stop reason: HOSPADM

## 2017-04-15 RX ORDER — CARVEDILOL 6.25 MG/1
6.25 TABLET ORAL 2 TIMES DAILY
Status: DISCONTINUED | OUTPATIENT
Start: 2017-04-15 | End: 2017-04-16

## 2017-04-15 RX ORDER — AMITRIPTYLINE HYDROCHLORIDE 50 MG/1
150 TABLET, FILM COATED ORAL
Status: DISCONTINUED | OUTPATIENT
Start: 2017-04-15 | End: 2017-04-17 | Stop reason: HOSPADM

## 2017-04-15 RX ORDER — LORAZEPAM 0.5 MG/1
0.5 TABLET ORAL
Status: DISCONTINUED | OUTPATIENT
Start: 2017-04-15 | End: 2017-04-17 | Stop reason: HOSPADM

## 2017-04-15 RX ADMIN — PRASUGREL HYDROCHLORIDE 10 MG: 10 TABLET, FILM COATED ORAL at 08:53

## 2017-04-15 RX ADMIN — ASPIRIN 81 MG CHEWABLE TABLET 81 MG: 81 TABLET CHEWABLE at 08:19

## 2017-04-15 RX ADMIN — Medication 10 ML: at 21:53

## 2017-04-15 RX ADMIN — INSULIN GLARGINE 40 UNITS: 100 INJECTION, SOLUTION SUBCUTANEOUS at 08:53

## 2017-04-15 RX ADMIN — ATORVASTATIN CALCIUM 10 MG: 10 TABLET, FILM COATED ORAL at 21:51

## 2017-04-15 RX ADMIN — METOPROLOL TARTRATE 12.5 MG: 25 TABLET ORAL at 08:52

## 2017-04-15 RX ADMIN — INSULIN LISPRO 2 UNITS: 100 INJECTION, SOLUTION INTRAVENOUS; SUBCUTANEOUS at 12:01

## 2017-04-15 RX ADMIN — AMITRIPTYLINE HYDROCHLORIDE 75 MG: 50 TABLET, FILM COATED ORAL at 01:42

## 2017-04-15 RX ADMIN — LISINOPRIL 5 MG: 5 TABLET ORAL at 10:46

## 2017-04-15 RX ADMIN — AMITRIPTYLINE HYDROCHLORIDE 150 MG: 50 TABLET, FILM COATED ORAL at 21:51

## 2017-04-15 RX ADMIN — Medication 10 ML: at 14:39

## 2017-04-15 RX ADMIN — CARVEDILOL 6.25 MG: 6.25 TABLET, FILM COATED ORAL at 17:20

## 2017-04-15 RX ADMIN — CARVEDILOL 6.25 MG: 6.25 TABLET, FILM COATED ORAL at 10:46

## 2017-04-15 NOTE — CARDIO/PULMONARY
Cardiac Wellness: MI education with catheterization brochure and smoking cessation education information given to Susi Gongora. Daughter at bedside. Educated using teach back method. Reviewed MI diagnosis definition and purpose of intervention. He has a history of hypertension and diabetes. Reviewed importance of medication compliance and follow up appointments with cardiologist.  Discussed purpose of effient and metoprolol and potential side effects. Smoking history assessed. Patient is a  smoker. Reviewed physiological effects of smoking on cardiovascular system. Discussed making a plan for quitting, website and telephone resources and support systems. Encouraged patient to ask physician about pharmaceutical options. Encouraged verbalization about emotional challenges and offered potential solutions. He has the nicotine patch on now but may go back on chantix. Wife entered room as this nurse was leaving so reviewed all info with her. Emphasized value of cardiac rehab, discussed Cardiac Wellness Program and encouraged enrollment. He is interested. Will follow up with patient by phone after discharge for participation. Susi Gongora verbalized understanding with questions answered.

## 2017-04-15 NOTE — PROGRESS NOTES
Hospitalist Progress Note  Magaly José MD  Office: [phone]  Cell:       Date of Service:  4/15/2017  NAME:  Sam Dorado  :  1960  MRN:  [id number]      Admission Summary: This is a 58-year-old man with a   past medical history significant for type 2 diabetes, suspected   obstructive sleep apnea, and hypertension, who was in his usual state   of health until the day of presentation at the emergency room, when   the patient developed chest pain. The chest pain was located at the left   side of the chest, associated with diaphoresis, as well as shortness of   breath. The chest pain is with radiation to the left shoulder. It is 8/10   in severity. There are no known aggravating or alleviating factors. The   patient was brought to the emergency room for further evaluation. When the patient arrived at the emergency room, he was diagnosed   with ST elevation myocardial infarction (STEMI). The patient was taken   straight to the cath lab, where the patient subsequently underwent   stent placement. Medical consult was requested by Dr. Kamari Calderon   for medical management of the patient's diabetes. The patient has   had no prior episodes of coronary artery disease. Interval history / Subjective:    Pt is due to re start home levimir 40 units this morning. He states he has a good appetite and thinks he can resume eating as normal.   At home he also takes 4 units of insulin with each meal, and is aware of plan to use ssi while here. Assessment & Plan: This is a 58-year-old man who presented in the   emergency room with chest pain, who was diagnosed with an ST   elevation myocardial infarction, taken to the cath lab, and underwent   cardiac stent placement. Medical consult was requested for medical   management of the patient's diabetes, as well as other medical   problems.  The management of this patient's medical problems are as   stated above    1. ST elevation myocardial infarction. 2. Type 2 diabetes with hyperglycemia. 3. Tobacco abuse. 4. Suspected obstructive sleep apnea. 5. Hypertension. 6. Acute renal failure.     PLAN   1. ST elevation myocardial infarction. The patient will continue   treatment as per cardiologist.   2. Type 2 diabetes. We will resume basal insulin. The patient will also   be placed on sliding scale with insulin coverage. Hemoglobin A1c is 7.4 on 4/14/17. We will hold oral agents to the time of discharge  from the hospital.   3. Tobacco abuse. The patient is advised to quit smoking. We will   place the patient on a Nicoderm patch. 4. Suspected obstructive sleep apnea. The patient is advised to follow   up with his primary care physician with   subsequent referral to pulmonologist for evaluation for sleep studies. 5. Hypertension. We will resume preadmission medication. We will   monitor the patient's blood pressure closely. 6. Acute renal failure. This is most likely due to volume depletion. We will carry out hydration with normal saline and monitor the patient's   renal function.     .       Code status: full  DVT prophylaxis: Not indicated. Care Plan discussed with: Patient/Family and Nurse  Disposition: TBD     Hospital Problems  Never Reviewed          Codes Class Noted POA    Myocardial infarction [facility]) ICD-10-CM: I21.3  ICD-9-CM: 410.90  4/14/2017 Unknown        STEMI (ST elevation myocardial infarction) [facility]) ICD-10-CM: I21.3  ICD-9-CM: 410.90  4/14/2017 Unknown                Review of Systems:   A comprehensive review of systems was negative except for that written in the HPI. Vital Signs:    Last 24hrs VS reviewed since prior progress note.  Most recent are:  Visit Vitals    /78    Pulse (!) 101    Temp 98.3 °F (36.8 °C)    Resp 20    Ht 5' 7\" (1.702 m)    Wt 92.2 kg (203 lb 4.2 oz)    SpO2 95%    BMI 31.84 kg/m2         Intake/Output Summary (Last 24 hours) at 04/15/17 0804  Last data filed at 04/15/17 0800   Gross per 24 hour   Intake           846.67 ml   Output             2530 ml   Net         -1683.33 ml        Physical Examination:          Constitutional:  No acute distress, cooperative, pleasant    ENT:  Oral mucous moist, oropharynx benign. Neck supple,   Eyes: pupils appear round, and bilaterally equal and symetrical.     Resp:  CTA bilaterally. No wheezing/rhonchi/rales. No accessory muscle use   CV:  Regular rhythm, normal rate, no murmurs, gallops, rubs    GI:  Soft, non distended, non tender. normoactive bowel sounds, no hepatosplenomegaly     Musculoskeletal:  No edema, warm, 2+ pulses throughout    Neurologic:  Moves all extremities. AAOx3, CN II-XII reviewed  Psych:  Normal speech and affect. Data Review:    Review and/or order of clinical lab test      Labs:     Recent Labs      04/15/17   0407  04/14/17   1925   WBC  14.8*  13.1*   HGB  13.8  16.8   HCT  41.2  48.3   PLT  190  246     Recent Labs      04/15/17   0407  04/14/17   1925   NA  141  138   K  3.7  3.5   CL  109*  107   CO2  22  19*   BUN  9  13   CREA  1.12  1.76*   GLU  161*  177*   CA  8.1*  9.3     Recent Labs      04/15/17   0407  04/14/17   1925   SGOT  55*  32   ALT  51  46   AP  59  79   TBILI  0.4  0.3   TP  6.2*  7.6   ALB  3.2*  4.0   GLOB  3.0  3.6     No results for input(s): INR, PTP, APTT in the last 72 hours. No lab exists for component: INREXT   No results for input(s): FE, TIBC, PSAT, FERR in the last 72 hours. No results found for: FOL, RBCF   No results for input(s): PH, PCO2, PO2 in the last 72 hours.   Recent Labs      04/15/17   0407  04/14/17   1925   CPK   --   192   CKNDX   --   1.4   TROIQ  6.08*  <0.04     Lab Results   Component Value Date/Time    Cholesterol, total 131 04/15/2017 04:07 AM    HDL Cholesterol 19 04/15/2017 04:07 AM    LDL, calculated 70.8 04/15/2017 04:07 AM    Triglyceride 206 04/15/2017 04:07 AM    CHOL/HDL Ratio 6.9 04/15/2017 04:07 AM     Lab Results   Component Value Date/Time    Glucose (POC) 198 04/14/2017 09:34 PM    Glucose (POC) 177 04/14/2017 07:25 PM     No results found for: COLOR, APPRN, SPGRU, REFSG, NAHED, PROTU, GLUCU, Anne Marie Rasp, BILU, Maricruz Haver, SHEILA, Yanique Caldron, GLUKE, EPSU, BACTU, WBCU, RBCU, CASTS, UCRY      ______________________________________________________________________  EXPECTED LENGTH OF STAY: - - -  ACTUAL LENGTH OF STAY:           1 Days               Parmjit Ching MD

## 2017-04-15 NOTE — PROCEDURES
Cardiac Catheterization Procedure Note   Patient: Keven Dwyer  MRN: [id number]  SSN: [id number]   YOB: 1960 Age: 60 y.o. Sex: male    Date of Procedure: 4/14/2017   Pre-procedure Diagnosis: STEMI  Post-procedure Diagnosis: Coronary Artery Disease  Procedure: Left Heart Cath, PCI and temporary pacemaker insertion  :  Dr. Jana Damon MD    Assistant(s):  None  Anesthesia: Moderate Sedation   Estimated Blood Loss: Less than 10 mL   Specimens Removed: None  Findings: 100% proximal to mid occlusion large RCA, treated with 3.5 x 18 and 3.5 x 15 Xience Alpine stents good result. LVEF 75% no WMA. LVEDP 20. Initially HR was 34 hypotensive, so temporary pacemaker inserted via right femoral vein. Integrilin and Angiomax given during case, Angioseal to groin.   Complications: None   Implants:  None  Signed by:  Jana Damon MD  4/14/2017  9:03 PM

## 2017-04-15 NOTE — CONSULTS
[address]   [address] [facility], [address] [place] [address] Ave   [facility]       Name:  Linda Macdonald   MR#:  [id number]   :  1960   Account #:  [de-identified]    Date of Consultation:  2017   Date of Adm:  2017       PRIMARY CARE PHYSICIAN: Dr. Paul Nash    REQUESTING PHYSICIAN: Dr. Evgeny Carlisle/Cardiology    REASON FOR CONSULTATION: Medical management of diabetes. SOURCE OF INFORMATION: The patient. CHIEF COMPLAINT: Chest pain. HISTORY OF PRESENT ILLNESS: This is a 53-year-old man with a   past medical history significant for type 2 diabetes, suspected   obstructive sleep apnea, and hypertension, who was in his usual state   of health until the day of presentation at the emergency room, when   the patient developed chest pain. The chest pain was located at the left   side of the chest, associated with diaphoresis, as well as shortness of   breath. The chest pain is with radiation to the left shoulder. It is 8/10   in severity. There are no known aggravating or alleviating factors. The   patient was brought to the emergency room for further evaluation. When the patient arrived at the emergency room, he was diagnosed   with ST elevation myocardial infarction (STEMI). The patient was taken   straight to the cath lab, where the patient subsequently underwent   stent placement. Medical consult was requested by Dr. Saint Rape   for medical management of the patient's diabetes. The patient has   had no prior episodes of coronary artery disease. PAST MEDICAL HISTORY: Type 2 diabetes, suspected obstructive   sleep apnea, hypertension. ALLERGIES: NO KNOWN DRUG ALLERGIES. MEDICATIONS   1. Lisinopril 2.5 mg daily. 2. Glucophage 1000 mg twice daily. 3. Elavil 150 mg daily at bedtime. 4. Lofibra 200 mg daily. 5. Levemir 40 units subcutaneously daily. 6. Sliding scale with insulin coverage. FAMILY HISTORY: This was reviewed.  His mother and father have   heart disease. PAST SURGICAL HISTORY: Not significant. SOCIAL HISTORY: No history of alcohol or tobacco abuse. REVIEW OF SYSTEMS   HEAD, EYES, EARS, NOSE AND THROAT: This is positive for   dizziness. No blurring of vision, no photophobia. RESPIRATORY: This is positive for shortness of breath. No cough, no   hemoptysis. CARDIOVASCULAR: This is positive for chest pain. No orthopnea, no   palpitation. GASTROINTESTINAL: This is positive for nausea. No vomiting, no   diarrhea, no constipation. GENITOURINARY: No dysuria, no urgency, and no frequency. All other systems are reviewed and they are negative. PHYSICAL EXAMINATION   GENERAL APPEARANCE: The patient appeared ill, in moderate   distress. VITAL SIGNS: Temperature not available, pulse 100, blood pressure   118/76, respiratory rate 18. Oxygen saturation 98%. HEAD: Normocephalic, atraumatic. EYES: Normal eye movements. No redness, no drainage. EARS: Normal external ears with no nasal drainage. NOSE: No deformities. No drainage. MOUTH AND THROAT: No visible oral lesion. NECK: Supple. No JVD, no thyromegaly. CHEST: Clear breath sounds. No wheezing, no crackles. HEART: Normal S1 and S2, regular. No clinically appreciable murmur. ABDOMEN: Soft, nontender, normal bowel sounds. CENTRAL NERVOUS SYSTEM: Alert, oriented x3. No gross   focal neurological deficits. EXTREMITIES: No edema. Pulses 2+ bilaterally. MUSCULOSKELETAL: No obvious joint deformity or swelling. SKIN: No active skin lesions seen in the exposed parts of the body. PSYCHIATRIC: Normal mood and affect. LYMPHATIC SYSTEM: No cervical lymphadenopathy. DIAGNOSTIC DATA: None. LABORATORY DATA: Chemistry: Sodium 138, potassium 3.5,   chloride 107, CO2 19, glucose 177, BUN 13, creatinine 1.76, calcium   9.3, bilirubin total 0.3, total protein 7.6, albumin level 4.0, globulin at   3.6, ALT 46, AST 32, alkaline phosphatase 79.  Hematology: WBC   13.1, hemoglobin 16.8, hematocrit 48.3, platelets at 206. ASSESSMENT   1. ST elevation myocardial infarction. 2. Type 2 diabetes with hyperglycemia. 3. Tobacco abuse. 4. Suspected obstructive sleep apnea. 5. Hypertension. 6. Acute renal failure. PLAN   1. ST elevation myocardial infarction. The patient will continue   treatment as per cardiologist.   2. Type 2 diabetes. We will resume basal insulin. The patient will also   be placed on sliding scale with insulin coverage. Will check   hemoglobin A1c level. We will hold oral agents to the time of discharge   from the hospital.   3. Tobacco abuse. The patient is advised to quit smoking. We will   place the patient on a Nicoderm patch. 4. Suspected obstructive sleep apnea. The patient is advised to follow   up with his primary care physician with   subsequent referral to pulmonologist for evaluation for sleep studies. 5. Hypertension. We will resume preadmission medication. We will   monitor the patient's blood pressure closely. 6. Acute renal failure. This is most likely due to volume depletion. We will carry out hydration with normal saline and monitor the patient's   renal function. SUMMARY: This is a 57-year-old man who presented in the   emergency room with chest pain, who was diagnosed with an ST   elevation myocardial infarction, taken to the cath lab, and underwent   cardiac stent placement. Medical consult was requested for medical   management of the patient's diabetes, as well as other medical   problems. The management of this patient's medical problems are as   stated above. Thank you for involving the hospitalist service in the care of this   patient. We will continue to follow the patient along with the cardiology   group. Less than 1 hour was spent on this medical consult.         MD ROBERTO Ferguson / HARLAN   D:  04/15/2017   02:02   T:  04/15/2017   04:28   Job #:  [id number]
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
## 2017-04-15 NOTE — PROGRESS NOTES
[phone] - received to [facility] from CCU, tele in place and verified with monitor tech, VSS, assessment completed.

## 2017-04-15 NOTE — PROGRESS NOTES
Spiritual Care Assessment/Progress Notes    Shawn Queen [id number]  [id number]    1960  60 y.o.  male    Patient Telephone Number: [phone] (home)   Restorationism Affiliation: Non Roman Catholic   Language: English   Extended Emergency Contact Information  Primary Emergency Contact: EdgarKimberSalma Ling  Address: [address] [place] [address] Phone: [phone]  Relation: Spouse   Patient Active Problem List    Diagnosis Date Noted    Myocardial infarction [facility]) 04/14/2017    STEMI (ST elevation myocardial infarction) ([facility] [address] 04/14/2017        Date: 4/15/2017       Level of Restorationism/Spiritual Activity:  []         Involved in carley tradition/spiritual practice    []         Not involved in carley tradition/spiritual practice  []         Spiritually oriented    []         Claims no spiritual orientation    []         seeking spiritual identity  []         Feels alienated from Sabianist practice/tradition  []         Feels angry about Sabianist practice/tradition  []         Spirituality/Sabianist tradition may be a resource for coping at this time.   [x]         Not able to assess due to medical condition    Services Provided Today:  []         crisis intervention    []         reading Scriptures  []         spiritual assessment    []         prayer  []         empathic listening/emotional support  []         rites and rituals (cite in comments)  []         life review     []         Sabianist support  []         theological development   []         advocacy  []         ethical dialog     []         blessing  []         bereavement support    []         support to family  []         anticipatory grief support   []         help with AMD  []         spiritual guidance    []         meditation      Spiritual Care Needs  []         Emotional Support  []         Spiritual/Restorationism Care  []         Loss/Adjustment  []         Advocacy/Referral /Ethics  []         No needs expressed at               this time  []         Other: (note in               comments)  [address]  []         Follow up visits with               pt/family  []         Provide materials  []         Schedule sacraments  []         Contact Community               Clergy  []         Follow up as needed  []         Other: (note in               comments)     Responded to page for this pt in [address]. Pt was actively being cared for and no family was present. [facility] consulted staff and was informed that family was en route, but an ETA was not offered. No pastoral care needs at this time. Please contact Spiritual Care services for any emotional or spiritual needs.     Adam Moran, Staff   Please call [phone] to page  if needed

## 2017-04-15 NOTE — ED TRIAGE NOTES
Triage note: Pt arrived via EMS from home as a Code STEMI. Pt is pale and diaphoretic upon arrival with SBP in the 60's. Pt is A&Ox 4 complaining of left sided CP. Pt was at the gym and came home with diaphoresis and CP.

## 2017-04-15 NOTE — PROGRESS NOTES
Problem: AMI: Day 1  Goal: Activity/Safety  Outcome: Progressing Towards Goal  Up with assistance  Goal: Consults, if ordered  Outcome: Progressing Towards Goal  Hospitalist consult for Diabetic Treatment  Goal: Nutrition/Diet  Outcome: Progressing Towards Goal  Diabetic Diet  Goal: Medications  Outcome: Progressing Towards Goal  Metoprolol  ASA  Effient        Problem: Falls - Risk of  Goal: *Absence of falls  Outcome: Progressing Towards Goal  Calls for assistance appropriately  Slip resistant footwear  Clutter free room  Up with assistance only  Bedside commode  Family at the bedside  Q1 hour safety checks      Goal: *Knowledge of fall prevention  Calls for assistance appropriately  Slip resistant footwear  Clutter free room  Up with assistance only  Bedside commode  Family at the bedside  Q1 hour safety checks

## 2017-04-15 NOTE — PROGRESS NOTES
Cardiology Progress Note  4/15/2017 1:00 PM    Admit Date: 4/14/2017    Admit Diagnosis: Myocardial infarction (HCC);STEMI (ST elevation myocardial *      Assessment:     Active Problems:    Myocardial infarction ([facility] (4/14/2017)      STEMI (ST elevation myocardial infarction) ([facility] (4/14/2017)      Pericardial effusion (4/15/2017)        Plan:     Acute Myocardial Infarction  improved   Increase medical therapy, move to floor. Echo shows normal LV function, but apparent pericardial effusion and thickened pericardium, seems unusual.  Given smoking history, will get chest CT. Abraham Miller MD  [phone]  Cell        Subjective:     Handy Yen denies chest pressure/discomfort. He reports symptoms are improved since yesterday. ROS: negative except as noted above.     Objective:       Visit Vitals    /90 (BP 1 Location: Right arm, BP Patient Position: At rest)    Pulse 85    Temp 98.1 °F (36.7 °C)    Resp 17    Ht 5' 7\" (1.702 m)    Wt 92.2 kg (203 lb 4.2 oz)    SpO2 99%    BMI 31.84 kg/m2       Current Facility-Administered Medications   Medication Dose Route Frequency    amitriptyline (ELAVIL) tablet 150 mg  150 mg Oral QHS    insulin glargine (LANTUS) injection 40 Units  40 Units SubCUTAneous DAILY    nicotine (NICODERM CQ) 21 mg/24 hr patch 1 Patch  1 Patch TransDERmal DAILY    carvedilol (COREG) tablet 6.25 mg  6.25 mg Oral BID    lisinopril (PRINIVIL, ZESTRIL) tablet 5 mg  5 mg Oral DAILY    LORazepam (ATIVAN) tablet 0.5 mg  0.5 mg Oral Q6H PRN    DOPamine (INTROPIN) 800 mg/250 mL (3,200 mcg/mL) infusion  5-20 mcg/kg/min IntraVENous TITRATE    DOPamine (INTROPIN) 800 mg/250 mL (3,200 mcg/mL) infusion  5-20 mcg/kg/min IntraVENous TITRATE    sodium chloride (NS) flush 5-10 mL  5-10 mL IntraVENous Q8H    sodium chloride (NS) flush 5-10 mL  5-10 mL IntraVENous PRN    acetaminophen (TYLENOL) tablet 650 mg  650 mg Oral Q4H PRN    atorvastatin (LIPITOR) tablet 10 mg 10 mg Oral QHS    prasugrel (EFFIENT) tablet 10 mg  10 mg Oral DAILY    aspirin chewable tablet 81 mg  81 mg Oral DAILY    insulin lispro (HUMALOG) injection   SubCUTAneous AC&HS    glucose chewable tablet 16 g  4 Tab Oral PRN    dextrose (D50W) injection syrg 12.5-25 g  12.5-25 g IntraVENous PRN    glucagon (GLUCAGEN) injection 1 mg  1 mg IntraMUSCular PRN    fentaNYL citrate (PF) injection 50 mcg  50 mcg IntraVENous Q4H PRN    zolpidem (AMBIEN) tablet 10 mg  10 mg Oral QHS PRN         Physical Exam:  Visit Vitals    /90 (BP 1 Location: Right arm, BP Patient Position: At rest)    Pulse 85    Temp 98.1 °F (36.7 °C)    Resp 17    Ht 5' 7\" (1.702 m)    Wt 92.2 kg (203 lb 4.2 oz)    SpO2 99%    BMI 31.84 kg/m2     General Appearance:  Well developed, well nourished,alert and oriented x 3,  individual in no acute distress. Ears/Nose/Mouth/Throat:   Hearing grossly normal.         Neck: Supple. Chest:   Lungs clear to auscultation bilaterally. Cardiovascular:  Regular rate and rhythm, S1, S2 normal, no murmur. Abdomen:   Soft, non-tender, bowel sounds are active. Extremities: No edema bilaterally. Skin: Warm and dry.                Telemetry: normal sinus rhythm    Data Review:     Labs:    Recent Results (from the past 24 hour(s))   EKG, 12 LEAD, INITIAL    Collection Time: 04/14/17  7:19 PM   Result Value Ref Range    Ventricular Rate 57 BPM    Atrial Rate 416 BPM    QRS Duration 72 ms    Q-T Interval 408 ms    QTC Calculation (Bezet) 397 ms    Calculated R Axis 22 degrees    Calculated T Axis 94 degrees    Diagnosis       Atrial fibrillation  Low voltage QRS  Inferolateral injury pattern  ** ** ACUTE MI / STEMI ** **  Consider right ventricular involvement in acute inferior infarct  No previous ECGs available     POC TROPONIN-I    Collection Time: 04/14/17  7:22 PM   Result Value Ref Range    Troponin-I (POC) <0.04 0.00 - 0.08 ng/mL   CBC WITH AUTOMATED DIFF    Collection Time: 04/14/17  7:25 PM   Result Value Ref Range    WBC 13.1 (H) 4.1 - 11.1 K/uL    RBC 5.10 4. 10 - 5.70 M/uL    HGB 16.8 12.1 - 17.0 g/dL    HCT 48.3 36.6 - 50.3 %    MCV 94.7 80.0 - 99.0 FL    MCH 32.9 26.0 - 34.0 PG    MCHC 34.8 30.0 - 36.5 g/dL    RDW 13.4 11.5 - 14.5 %    PLATELET 076 758 - 231 K/uL    NEUTROPHILS 60 32 - 75 %    LYMPHOCYTES 32 12 - 49 %    MONOCYTES 7 5 - 13 %    EOSINOPHILS 1 0 - 7 %    BASOPHILS 0 0 - 1 %    ABS. NEUTROPHILS 7.8 1.8 - 8.0 K/UL    ABS. LYMPHOCYTES 4.1 (H) 0.8 - 3.5 K/UL    ABS. MONOCYTES 1.0 0.0 - 1.0 K/UL    ABS. EOSINOPHILS 0.2 0.0 - 0.4 K/UL    ABS. BASOPHILS 0.0 0.0 - 0.1 K/UL   METABOLIC PANEL, COMPREHENSIVE    Collection Time: 04/14/17  7:25 PM   Result Value Ref Range    Sodium 138 136 - 145 mmol/L    Potassium 3.5 3.5 - 5.1 mmol/L    Chloride 107 97 - 108 mmol/L    CO2 19 (L) 21 - 32 mmol/L    Anion gap 12 5 - 15 mmol/L    Glucose 177 (H) 65 - 100 mg/dL    BUN 13 6 - 20 MG/DL    Creatinine 1.76 (H) 0.70 - 1.30 MG/DL    BUN/Creatinine ratio 7 (L) 12 - 20      GFR est AA 48 (L) >60 ml/min/1.73m2    GFR est non-AA 40 (L) >60 ml/min/1.73m2    Calcium 9.3 8.5 - 10.1 MG/DL    Bilirubin, total 0.3 0.2 - 1.0 MG/DL    ALT (SGPT) 46 12 - 78 U/L    AST (SGOT) 32 15 - 37 U/L    Alk.  phosphatase 79 45 - 117 U/L    Protein, total 7.6 6.4 - 8.2 g/dL    Albumin 4.0 3.5 - 5.0 g/dL    Globulin 3.6 2.0 - 4.0 g/dL    A-G Ratio 1.1 1.1 - 2.2     TROPONIN I    Collection Time: 04/14/17  7:25 PM   Result Value Ref Range    Troponin-I, Qt. <0.04 <0.05 ng/mL   CK W/ CKMB & INDEX    Collection Time: 04/14/17  7:25 PM   Result Value Ref Range     39 - 308 U/L    CK - MB 2.7 <3.6 NG/ML    CK-MB Index 1.4 0 - 2.5     POC CHEM8    Collection Time: 04/14/17  7:25 PM   Result Value Ref Range    Calcium, ionized (POC) 1.15 1.12 - 1.32 MMOL/L    Sodium (POC) 142 136 - 145 MMOL/L    Potassium (POC) 3.6 3.5 - 5.1 MMOL/L    Chloride (POC) 105 98 - 107 MMOL/L    CO2 (POC) 17 (L) 21 - 32 MMOL/L    Anion gap (POC) 25 (H) 5 - 15 mmol/L    Glucose (POC) 177 (H) 65 - 100 MG/DL    BUN (POC) 14 9 - 20 MG/DL    Creatinine (POC) 1.5 (H) 0.6 - 1.3 MG/DL    GFR-AA (POC) 58 (L) >60 ml/min/1.73m2    GFR, non-AA (POC) 48 (L) >60 ml/min/1.73m2    Hemoglobin (POC) 17.7 (H) 12.1 - 17.0 GM/DL    Hematocrit (POC) 52 (H) 36.6 - 50.3 %    Comment Comment Not Indicated. EKG, 12 LEAD, INITIAL    Collection Time: 04/14/17  9:28 PM   Result Value Ref Range    Ventricular Rate 105 BPM    Atrial Rate 105 BPM    P-R Interval 192 ms    QRS Duration 88 ms    Q-T Interval 358 ms    QTC Calculation (Bezet) 473 ms    Calculated P Axis 66 degrees    Calculated R Axis 1 degrees    Calculated T Axis 69 degrees    Diagnosis       Sinus tachycardia  Low voltage QRS  Inferior infarct , age undetermined  Cannot rule out Anterior infarct , age undetermined  When compared with ECG of 14-APR-2017 19:25,  Significant changes have occurred     GLUCOSE, POC    Collection Time: 04/14/17  9:34 PM   Result Value Ref Range    Glucose (POC) 198 (H) 65 - 100 mg/dL    Performed by Yony ALDRIDGE(ROSALINO)    METABOLIC PANEL, COMPREHENSIVE    Collection Time: 04/15/17  4:07 AM   Result Value Ref Range    Sodium 141 136 - 145 mmol/L    Potassium 3.7 3.5 - 5.1 mmol/L    Chloride 109 (H) 97 - 108 mmol/L    CO2 22 21 - 32 mmol/L    Anion gap 10 5 - 15 mmol/L    Glucose 161 (H) 65 - 100 mg/dL    BUN 9 6 - 20 MG/DL    Creatinine 1.12 0.70 - 1.30 MG/DL    BUN/Creatinine ratio 8 (L) 12 - 20      GFR est AA >60 >60 ml/min/1.73m2    GFR est non-AA >60 >60 ml/min/1.73m2    Calcium 8.1 (L) 8.5 - 10.1 MG/DL    Bilirubin, total 0.4 0.2 - 1.0 MG/DL    ALT (SGPT) 51 12 - 78 U/L    AST (SGOT) 55 (H) 15 - 37 U/L    Alk.  phosphatase 59 45 - 117 U/L    Protein, total 6.2 (L) 6.4 - 8.2 g/dL    Albumin 3.2 (L) 3.5 - 5.0 g/dL    Globulin 3.0 2.0 - 4.0 g/dL    A-G Ratio 1.1 1.1 - 2.2     BNP    Collection Time: 04/15/17  4:07 AM   Result Value Ref Range    BNP 39 0 - 100 pg/mL   CBC W/O DIFF    Collection Time: 04/15/17  4:07 AM   Result Value Ref Range    WBC 14.8 (H) 4.1 - 11.1 K/uL    RBC 4.37 4.10 - 5.70 M/uL    HGB 13.8 12.1 - 17.0 g/dL    HCT 41.2 36.6 - 50.3 %    MCV 94.3 80.0 - 99.0 FL    MCH 31.6 26.0 - 34.0 PG    MCHC 33.5 30.0 - 36.5 g/dL    RDW 13.7 11.5 - 14.5 %    PLATELET 219 822 - 277 K/uL   LIPID PANEL    Collection Time: 04/15/17  4:07 AM   Result Value Ref Range    LIPID PROFILE          Cholesterol, total 131 <200 MG/DL    Triglyceride 206 (H) <150 MG/DL    HDL Cholesterol 19 MG/DL    LDL, calculated 70.8 0 - 100 MG/DL    VLDL, calculated 41.2 MG/DL    CHOL/HDL Ratio 6.9 (H) 0 - 5.0     TROPONIN I    Collection Time: 04/15/17  4:07 AM   Result Value Ref Range    Troponin-I, Qt. 6.08 (H) <0.05 ng/mL   HEMOGLOBIN A1C WITH EAG    Collection Time: 04/15/17  4:07 AM   Result Value Ref Range    Hemoglobin A1c 7.4 (H) 4.2 - 6.3 %    Est. average glucose 166 mg/dL   GLUCOSE, POC    Collection Time: 04/15/17  8:03 AM   Result Value Ref Range    Glucose (POC) 110 (H) 65 - 100 mg/dL    Performed by KELLY MAGALLON    EKG, 12 LEAD, INITIAL    Collection Time: 04/15/17  8:05 AM   Result Value Ref Range    Ventricular Rate 93 BPM    Atrial Rate 93 BPM    P-R Interval 168 ms    QRS Duration 86 ms    Q-T Interval 364 ms    QTC Calculation (Bezet) 452 ms    Calculated P Axis 41 degrees    Calculated R Axis -32 degrees    Calculated T Axis 25 degrees    Diagnosis       Normal sinus rhythm  Left axis deviation  Low voltage QRS  When compared with ECG of 14-APR-2017 21:28,  Non-specific change in ST segment in Anterior leads     GLUCOSE, POC    Collection Time: 04/15/17 11:56 AM   Result Value Ref Range    Glucose (POC) 161 (H) 65 - 100 mg/dL    Performed by Schuyler Mckeon MD

## 2017-04-15 NOTE — PROGRESS NOTES
TRANSFER - IN REPORT:  Verbal report received from [facility] on Venancio Zuñiga  being received from [facility] for routine progression of care  report consisted of patients Situation, Background, Assessment and Recommendations(SBAR). Information from the following report(s) SBAR, Kardex, ED Summary, Procedure Summary, Intake/Output, MAR, Accordion, Recent Results and Med Rec Status was reviewed with the receiving nurse. pportunity for questions and clarification was provided. Assessment completed upon patients arrival to unit and care assumed. 2200-PATIENT TO CCU 23. Primary Nurse Heron Rucker, MARU and Abad Moore RN performed a dual skin assessment on this patient No impairment noted Rodrigo score is 23. DRESSING CLEAN DRY AND INTACT-PULSES +1 NO ISSUES OVER NIGHT    BEDSIDE shift change report given to ITZEL Venegas (oncoming nurse) by Suzanne Soliz (offgoing nurse). Report included the following information SBAR, Kardex, ED Summary, OR Summary, Procedure Summary, Intake/Output, MAR, Accordion, Recent Results, Med Rec Status, Cardiac Rhythm NSR and Alarm Parameters .

## 2017-04-15 NOTE — ROUTINE PROCESS
TRANSFER - OUT REPORT:    Verbal report given to ROSA FAULKNER RN(name) on Shad Schwarz  being transferred to CCU(unit) for routine progression of care       Report consisted of patients Situation, Background, Assessment and   Recommendations(SBAR). Information from the following report(s) SBAR, Kardex, Procedure Summary and MAR was reviewed with the receiving nurse. Lines:   Peripheral IV 04/14/17 Right Antecubital (Active)   Site Assessment Clean, dry, & intact 4/14/2017  7:18 PM   Phlebitis Assessment 0 4/14/2017  7:18 PM   Infiltration Assessment 0 4/14/2017  7:18 PM   Dressing Status Clean, dry, & intact 4/14/2017  7:18 PM   Dressing Type Transparent 4/14/2017  7:18 PM   Hub Color/Line Status Flushed 4/14/2017  7:18 PM   Action Taken Blood drawn 4/14/2017  7:18 PM       Peripheral IV 04/14/17 Left Antecubital (Active)   Site Assessment Clean, dry, & intact 4/14/2017  7:20 PM   Phlebitis Assessment 0 4/14/2017  7:20 PM   Infiltration Assessment 0 4/14/2017  7:20 PM   Dressing Status Clean, dry, & intact 4/14/2017  7:20 PM   Dressing Type Transparent 4/14/2017  7:20 PM   Hub Color/Line Status Flushed 4/14/2017  7:20 PM       Peripheral IV 04/14/17 Right Wrist (Active)   Site Assessment Clean, dry, & intact 4/14/2017  8:44 PM   Phlebitis Assessment 0 4/14/2017  8:44 PM   Infiltration Assessment 0 4/14/2017  8:44 PM   Dressing Status Clean, dry, & intact 4/14/2017  8:44 PM   Dressing Type Transparent 4/14/2017  8:44 PM   Hub Color/Line Status Flushed 4/14/2017  8:44 PM        Opportunity for questions and clarification was provided.       Patient transported with:   Monitor  O2 @ 2 liters  Registered Nurse  [facility]

## 2017-04-15 NOTE — PROGRESS NOTES
0730- Report obtained from [address] [facility].  0900- Dr. Jovani Parish at the bedside. Patient is feeling well. Orders to move to CVSU today. No further troponin testing is needed per MD.  1130- Family at the bedside. Patient feeling well.  1300- Lunch completed. Uneventful morning. 1400- Patient down to CT with nurse and monitor. 1420- Patient back from scan. Uneventful trip. Reports some intermittent pain in the right knee with ambulation only. ? Whether this has to do with a fall yesterday. 1600- TRANSFER - OUT REPORT:    Verbal report given to Orquidea MAHONEY on Sanjuana Ferrer  being transferred to CVSU for routine progression of care       Report consisted of patients Situation, Background, Assessment and   Recommendations(SBAR). Information from the following report(s) SBAR, Kardex and MAR was reviewed with the receiving nurse.     Lines:   Peripheral IV 04/14/17 Right Antecubital (Active)   Site Assessment Clean, dry, & intact 4/15/2017  3:57 PM   Phlebitis Assessment 0 4/15/2017  3:57 PM   Infiltration Assessment 0 4/15/2017  3:57 PM   Dressing Status Clean, dry, & intact 4/15/2017  3:57 PM   Dressing Type Tape;Transparent 4/15/2017  3:57 PM   Hub Color/Line Status Pink 4/15/2017  8:00 AM   Action Taken Open ports on tubing capped 4/15/2017  8:00 AM   Alcohol Cap Used Yes 4/15/2017  8:00 AM       Peripheral IV 04/14/17 Left Antecubital (Active)   Site Assessment Other (Comment) 4/15/2017 12:00 PM   Phlebitis Assessment 0 4/15/2017  8:00 AM   Infiltration Assessment 0 4/15/2017  8:00 AM   Dressing Status Clean, dry, & intact 4/15/2017  8:00 AM   Dressing Type Transparent 4/15/2017  8:00 AM   Hub Color/Line Status Pink 4/15/2017  8:00 AM   Action Taken Open ports on tubing capped 4/15/2017  8:00 AM   Alcohol Cap Used Yes 4/15/2017  8:00 AM       Peripheral IV 04/14/17 Right Wrist (Active)   Site Assessment Other (Comment) 4/15/2017 12:00 PM   Phlebitis Assessment 0 4/15/2017  8:00 AM   Infiltration Assessment 0 4/15/2017  8:00 AM   Dressing Status Clean, dry, & intact 4/15/2017  8:00 AM   Dressing Type Transparent 4/15/2017  8:00 AM   Hub Color/Line Status Green 4/15/2017  8:00 AM   Action Taken Open ports on tubing capped 4/15/2017  8:00 AM   Alcohol Cap Used Yes 4/15/2017  8:00 AM        Opportunity for questions and clarification was provided.       Patient transported with:   Monitor  Registered Nurse

## 2017-04-15 NOTE — H&P
History and Physical Note      Assessment:  STEMI     Recommendations:  cardiac catheterization  Abraham Miller MD  [phone]  Prachi Yen is a 60 y.o. male who presents with a complaint of chest pain. He has no prior history of cardiac disease including CAD. He smokes > 1 pack per day and has DM on insulin. He was working out at [employer] and began having severe chest pain and sweating. At present the patient  is unchanged since initial presentation. Initial  therapy in the ED has included O2. The patient is being admitted for urgent cath.     Current Facility-Administered Medications   Medication Dose Route Frequency    0.9% sodium chloride infusion   mL/hr IntraVENous CONTINUOUS    atropine injection 0.5-1 mg  0.5-1 mg IntraVENous PRN    bivalirudin (ANGIOMAX) 250 mg in 0.9% sodium chloride (MBP/ADV) 50 mL  1.75 mg/kg/hr IntraVENous PRN    eptifibatide (INTEGRILIN) 0.75 mg/mL infusion  2 mcg/kg/min IntraVENous PRN    fentaNYL citrate (PF) injection  mcg   mcg IntraVENous Multiple    heparin (porcine) 1,000 unit/mL injection 1,000-5,000 Units  1,000-5,000 Units IntraVENous Multiple    heparinized saline 2 units/mL infusion 2,000 Units  2,000 Units Irrigation PRN    iopamidol (ISOVUE-370) 76 % injection 150-300 mL  150-300 mL IntraVENous PRN    iopamidol (ISOVUE-370) 76 % injection 1-50 mL  1-50 mL IntraVENous PRN    lidocaine (XYLOCAINE) 10 mg/mL (1 %) injection 10-30 mL  10-30 mL SubCUTAneous Multiple    midazolam (VERSED) injection 0.5-10 mg  0.5-10 mg IntraVENous Multiple    nitroglycerin 100 mcg/ml compounded injection  1 Vial IntraCORONary Multiple    saline peripheral flush soln 5-10 mL  5-10 mL InterCATHeter PRN    DOPamine (INTROPIN) 800 mg/250 mL (3,200 mcg/mL) infusion  5-20 mcg/kg/min IntraVENous TITRATE    morphine 2 mg/mL injection        DOPamine (INTROPIN) 800 mg/250 mL (3,200 mcg/mL) infusion  5-20 mcg/kg/min IntraVENous TITRATE    prasugrel (EFFIENT) tablet 10-60 mg  10-60 mg Oral ONCE PRN    insulin lispro (HUMALOG) injection   SubCUTAneous AC&HS    glucose chewable tablet 16 g  4 Tab Oral PRN    dextrose (D50W) injection syrg 12.5-25 g  12.5-25 g IntraVENous PRN    glucagon (GLUCAGEN) injection 1 mg  1 mg IntraMUSCular PRN    fentaNYL citrate (PF) injection 50 mcg  50 mcg IntraVENous Q4H PRN    zolpidem (AMBIEN) tablet 10 mg  10 mg Oral QHS PRN     History reviewed. No pertinent past medical history. Patient Active Problem List   Diagnosis Code    [facility] I21.3     No Known Allergies  Social History   Substance Use Topics    Smoking status: Unknown If Ever Smoked    Smokeless tobacco: None    Alcohol use None     History reviewed. No pertinent family history. Review of Symptoms:  A comprehensive review of systems was negative except for that written in the HPI. Objective:      Visit Vitals    BP 95/57    Pulse (!) 104    Resp 20    Ht 5' 7\" (1.702 m)    Wt 86.2 kg (190 lb)    SpO2 97%    BMI 29.76 kg/m2       Physical Exam    Visit Vitals    BP 95/57    Pulse (!) 104    Resp 20    Ht 5' 7\" (1.702 m)    Wt 86.2 kg (190 lb)    SpO2 97%    BMI 29.76 kg/m2     General Appearance:  Well developed, well nourished,alert and oriented x 3,  individual in acute distress. Ears/Nose/Mouth/Throat:   Hearing grossly normal.         Neck: Supple. Chest:   Lungs clear to auscultation bilaterally. Cardiovascular:  Regular rate and rhythm, S1, S2 normal, no murmur. Abdomen:   Soft, non-tender, bowel sounds are active. Extremities: No edema bilaterally. Skin: Warm and dry.                Cardiographics    Telemetry: profound sinus bradycardia  ECG: normal sinus rhythm, ST elevation in inferior leads  Echocardiogram: Not done    Labs:   Recent Results (from the past 24 hour(s))   EKG, 12 LEAD, INITIAL    Collection Time: 04/14/17  7:19 PM   Result Value Ref Range    Ventricular Rate 57 BPM    Atrial Rate 416 BPM    QRS Duration 72 ms    Q-T Interval 408 ms    QTC Calculation (Bezet) 397 ms    Calculated R Axis 22 degrees    Calculated T Axis 94 degrees    Diagnosis       Atrial fibrillation  Low voltage QRS  Inferolateral injury pattern  ** ** ACUTE MI / STEMI ** **  Consider right ventricular involvement in acute inferior infarct  No previous ECGs available     POC TROPONIN-I    Collection Time: 04/14/17  7:22 PM   Result Value Ref Range    Troponin-I (POC) <0.04 0.00 - 0.08 ng/mL   CBC WITH AUTOMATED DIFF    Collection Time: 04/14/17  7:25 PM   Result Value Ref Range    WBC 13.1 (H) 4.1 - 11.1 K/uL    RBC 5.10 4. 10 - 5.70 M/uL    HGB 16.8 12.1 - 17.0 g/dL    HCT 48.3 36.6 - 50.3 %    MCV 94.7 80.0 - 99.0 FL    MCH 32.9 26.0 - 34.0 PG    MCHC 34.8 30.0 - 36.5 g/dL    RDW 13.4 11.5 - 14.5 %    PLATELET 682 677 - 093 K/uL    NEUTROPHILS 60 32 - 75 %    LYMPHOCYTES 32 12 - 49 %    MONOCYTES 7 5 - 13 %    EOSINOPHILS 1 0 - 7 %    BASOPHILS 0 0 - 1 %    ABS. NEUTROPHILS 7.8 1.8 - 8.0 K/UL    ABS. LYMPHOCYTES 4.1 (H) 0.8 - 3.5 K/UL    ABS. MONOCYTES 1.0 0.0 - 1.0 K/UL    ABS. EOSINOPHILS 0.2 0.0 - 0.4 K/UL    ABS. BASOPHILS 0.0 0.0 - 0.1 K/UL   METABOLIC PANEL, COMPREHENSIVE    Collection Time: 04/14/17  7:25 PM   Result Value Ref Range    Sodium 138 136 - 145 mmol/L    Potassium 3.5 3.5 - 5.1 mmol/L    Chloride 107 97 - 108 mmol/L    CO2 19 (L) 21 - 32 mmol/L    Anion gap 12 5 - 15 mmol/L    Glucose 177 (H) 65 - 100 mg/dL    BUN 13 6 - 20 MG/DL    Creatinine 1.76 (H) 0.70 - 1.30 MG/DL    BUN/Creatinine ratio 7 (L) 12 - 20      GFR est AA 48 (L) >60 ml/min/1.73m2    GFR est non-AA 40 (L) >60 ml/min/1.73m2    Calcium 9.3 8.5 - 10.1 MG/DL    Bilirubin, total 0.3 0.2 - 1.0 MG/DL    ALT (SGPT) 46 12 - 78 U/L    AST (SGOT) 32 15 - 37 U/L    Alk.  phosphatase 79 45 - 117 U/L    Protein, total 7.6 6.4 - 8.2 g/dL    Albumin 4.0 3.5 - 5.0 g/dL    Globulin 3.6 2.0 - 4.0 g/dL    A-G Ratio 1.1 1.1 - 2.2     TROPONIN I    Collection Time: 04/14/17  7:25 PM   Result Value Ref Range    Troponin-I, Qt. <0.04 <0.05 ng/mL   CK W/ CKMB & INDEX    Collection Time: 04/14/17  7:25 PM   Result Value Ref Range     39 - 308 U/L    CK - MB 2.7 <3.6 NG/ML    CK-MB Index 1.4 0 - 2.5     POC CHEM8    Collection Time: 04/14/17  7:25 PM   Result Value Ref Range    Calcium, ionized (POC) 1.15 1.12 - 1.32 MMOL/L    Sodium (POC) 142 136 - 145 MMOL/L    Potassium (POC) 3.6 3.5 - 5.1 MMOL/L    Chloride (POC) 105 98 - 107 MMOL/L    CO2 (POC) 17 (L) 21 - 32 MMOL/L    Anion gap (POC) 25 (H) 5 - 15 mmol/L    Glucose (POC) 177 (H) 65 - 100 MG/DL    BUN (POC) 14 9 - 20 MG/DL    Creatinine (POC) 1.5 (H) 0.6 - 1.3 MG/DL    GFR-AA (POC) 58 (L) >60 ml/min/1.73m2    GFR, non-AA (POC) 48 (L) >60 ml/min/1.73m2    Hemoglobin (POC) 17.7 (H) 12.1 - 17.0 GM/DL    Hematocrit (POC) 52 (H) 36.6 - 50.3 %    Comment Comment Not Indicated.          Cleone Gitelman, MD

## 2017-04-16 PROBLEM — I31.39 PERICARDIAL EFFUSION: Status: RESOLVED | Noted: 2017-04-15 | Resolved: 2017-04-16

## 2017-04-16 PROBLEM — I10 ESSENTIAL HYPERTENSION: Status: ACTIVE | Noted: 2017-04-16

## 2017-04-16 LAB
ANION GAP BLD CALC-SCNC: 10 MMOL/L (ref 5–15)
ATRIAL RATE: 105 BPM
ATRIAL RATE: 41 BPM
ATRIAL RATE: 416 BPM
ATRIAL RATE: 93 BPM
BNP SERPL-MCNC: 94 PG/ML (ref 0–100)
BUN SERPL-MCNC: 10 MG/DL (ref 6–20)
BUN/CREAT SERPL: 11 (ref 12–20)
CALCIUM SERPL-MCNC: 8.5 MG/DL (ref 8.5–10.1)
CALCULATED P AXIS, ECG09: 41 DEGREES
CALCULATED P AXIS, ECG09: 66 DEGREES
CALCULATED P AXIS, ECG09: 76 DEGREES
CALCULATED R AXIS, ECG10: -32 DEGREES
CALCULATED R AXIS, ECG10: 1 DEGREES
CALCULATED R AXIS, ECG10: 22 DEGREES
CALCULATED R AXIS, ECG10: 54 DEGREES
CALCULATED T AXIS, ECG11: 102 DEGREES
CALCULATED T AXIS, ECG11: 25 DEGREES
CALCULATED T AXIS, ECG11: 69 DEGREES
CALCULATED T AXIS, ECG11: 94 DEGREES
CHLORIDE SERPL-SCNC: 111 MMOL/L (ref 97–108)
CO2 SERPL-SCNC: 22 MMOL/L (ref 21–32)
CREAT SERPL-MCNC: 0.95 MG/DL (ref 0.7–1.3)
DIAGNOSIS, 93000: NORMAL
GLUCOSE BLD STRIP.AUTO-MCNC: 108 MG/DL (ref 65–100)
GLUCOSE BLD STRIP.AUTO-MCNC: 115 MG/DL (ref 65–100)
GLUCOSE BLD STRIP.AUTO-MCNC: 132 MG/DL (ref 65–100)
GLUCOSE BLD STRIP.AUTO-MCNC: 162 MG/DL (ref 65–100)
GLUCOSE BLD STRIP.AUTO-MCNC: 85 MG/DL (ref 65–100)
GLUCOSE SERPL-MCNC: 81 MG/DL (ref 65–100)
P-R INTERVAL, ECG05: 168 MS
P-R INTERVAL, ECG05: 192 MS
POTASSIUM SERPL-SCNC: 3.5 MMOL/L (ref 3.5–5.1)
Q-T INTERVAL, ECG07: 358 MS
Q-T INTERVAL, ECG07: 364 MS
Q-T INTERVAL, ECG07: 408 MS
Q-T INTERVAL, ECG07: 444 MS
QRS DURATION, ECG06: 120 MS
QRS DURATION, ECG06: 72 MS
QRS DURATION, ECG06: 86 MS
QRS DURATION, ECG06: 88 MS
QTC CALCULATION (BEZET), ECG08: 370 MS
QTC CALCULATION (BEZET), ECG08: 397 MS
QTC CALCULATION (BEZET), ECG08: 452 MS
QTC CALCULATION (BEZET), ECG08: 473 MS
SERVICE CMNT-IMP: ABNORMAL
SERVICE CMNT-IMP: NORMAL
SODIUM SERPL-SCNC: 143 MMOL/L (ref 136–145)
VENTRICULAR RATE, ECG03: 105 BPM
VENTRICULAR RATE, ECG03: 42 BPM
VENTRICULAR RATE, ECG03: 57 BPM
VENTRICULAR RATE, ECG03: 93 BPM

## 2017-04-16 PROCEDURE — 65660000000 HC RM CCU STEPDOWN

## 2017-04-16 PROCEDURE — 74011636637 HC RX REV CODE- 636/637: Performed by: INTERNAL MEDICINE

## 2017-04-16 PROCEDURE — 36415 COLL VENOUS BLD VENIPUNCTURE: CPT | Performed by: SPECIALIST

## 2017-04-16 PROCEDURE — 74011250637 HC RX REV CODE- 250/637: Performed by: NURSE PRACTITIONER

## 2017-04-16 PROCEDURE — 80048 BASIC METABOLIC PNL TOTAL CA: CPT | Performed by: SPECIALIST

## 2017-04-16 PROCEDURE — 74011636637 HC RX REV CODE- 636/637: Performed by: SPECIALIST

## 2017-04-16 PROCEDURE — 74011250637 HC RX REV CODE- 250/637: Performed by: INTERNAL MEDICINE

## 2017-04-16 PROCEDURE — 74011250637 HC RX REV CODE- 250/637: Performed by: SPECIALIST

## 2017-04-16 PROCEDURE — 82962 GLUCOSE BLOOD TEST: CPT

## 2017-04-16 PROCEDURE — 83880 ASSAY OF NATRIURETIC PEPTIDE: CPT | Performed by: SPECIALIST

## 2017-04-16 RX ORDER — LISINOPRIL 10 MG/1
10 TABLET ORAL DAILY
Status: DISCONTINUED | OUTPATIENT
Start: 2017-04-17 | End: 2017-04-17 | Stop reason: HOSPADM

## 2017-04-16 RX ORDER — CARVEDILOL 12.5 MG/1
12.5 TABLET ORAL 2 TIMES DAILY
Status: DISCONTINUED | OUTPATIENT
Start: 2017-04-16 | End: 2017-04-16

## 2017-04-16 RX ORDER — CARVEDILOL 12.5 MG/1
25 TABLET ORAL 2 TIMES DAILY
Status: DISCONTINUED | OUTPATIENT
Start: 2017-04-16 | End: 2017-04-17 | Stop reason: HOSPADM

## 2017-04-16 RX ORDER — HYDROCHLOROTHIAZIDE 25 MG/1
12.5 TABLET ORAL DAILY
Status: DISCONTINUED | OUTPATIENT
Start: 2017-04-16 | End: 2017-04-17 | Stop reason: HOSPADM

## 2017-04-16 RX ADMIN — HYDROCHLOROTHIAZIDE 12.5 MG: 25 TABLET ORAL at 12:25

## 2017-04-16 RX ADMIN — CARVEDILOL 6.25 MG: 6.25 TABLET, FILM COATED ORAL at 08:37

## 2017-04-16 RX ADMIN — PRASUGREL HYDROCHLORIDE 10 MG: 10 TABLET, FILM COATED ORAL at 08:37

## 2017-04-16 RX ADMIN — AMITRIPTYLINE HYDROCHLORIDE 150 MG: 50 TABLET, FILM COATED ORAL at 22:27

## 2017-04-16 RX ADMIN — Medication 10 ML: at 07:07

## 2017-04-16 RX ADMIN — INSULIN GLARGINE 40 UNITS: 100 INJECTION, SOLUTION SUBCUTANEOUS at 08:37

## 2017-04-16 RX ADMIN — CARVEDILOL 25 MG: 12.5 TABLET, FILM COATED ORAL at 17:58

## 2017-04-16 RX ADMIN — Medication 10 ML: at 22:00

## 2017-04-16 RX ADMIN — Medication 10 ML: at 15:42

## 2017-04-16 RX ADMIN — INSULIN LISPRO 2 UNITS: 100 INJECTION, SOLUTION INTRAVENOUS; SUBCUTANEOUS at 12:40

## 2017-04-16 RX ADMIN — ASPIRIN 81 MG CHEWABLE TABLET 81 MG: 81 TABLET CHEWABLE at 08:37

## 2017-04-16 RX ADMIN — LISINOPRIL 5 MG: 5 TABLET ORAL at 08:37

## 2017-04-16 RX ADMIN — ATORVASTATIN CALCIUM 10 MG: 10 TABLET, FILM COATED ORAL at 22:27

## 2017-04-16 NOTE — PROGRESS NOTES
Hospitalist Progress Note  Magaly José MD  Office: [phone]  Cell:       Date of Service:  2017  NAME:  Sam Dorado  :  1960  MRN:  [id number]      Admission Summary: This is a 77-year-old man with a   past medical history significant for type 2 diabetes, suspected   obstructive sleep apnea, and hypertension, who was in his usual state   of health until the day of presentation at the emergency room, when   the patient developed chest pain. The chest pain was located at the left   side of the chest, associated with diaphoresis, as well as shortness of   breath. The chest pain is with radiation to the left shoulder. It is 8/10   in severity. There are no known aggravating or alleviating factors. The   patient was brought to the emergency room for further evaluation. When the patient arrived at the emergency room, he was diagnosed   with ST elevation myocardial infarction (STEMI). The patient was taken   straight to the cath lab, where the patient subsequently underwent   stent placement. Medical consult was requested by Dr. Kamari Calderon   for medical management of the patient's diabetes. The patient has   had no prior episodes of coronary artery disease. Interval history / Subjective:    Pt is due to re start home levimir 40 units yesterday am.  FBS today 85, range in last 24 hours . At home he also takes 4 units of insulin with each meal, and is aware of plan to use ssi while here. Assessment & Plan: This is a 77-year-old man who presented in the   emergency room with chest pain, who was diagnosed with an ST   elevation myocardial infarction, taken to the cath lab, and underwent   cardiac stent placement. Medical consult was requested for medical   management of the patient's diabetes, as well as other medical   problems. The management of this patient's medical problems are as   stated above    1.  ST elevation myocardial infarction. 2. Type 2 diabetes with hyperglycemia. 3. Tobacco abuse. 4. Suspected obstructive sleep apnea. 5. Hypertension. 6. Acute renal failure.     PLAN   1. ST elevation myocardial infarction. The patient will continue   treatment as per cardiologist.   2. Type 2 diabetes. We will resume basal insulin. The patient will also   be placed on sliding scale with insulin coverage. Hemoglobin A1c is 7.4 on 4/14/17. We will hold oral agents to the time of discharge  from the hospital.   3. Tobacco abuse. The patient is advised to quit smoking. We will   place the patient on a Nicoderm patch. 4. Suspected obstructive sleep apnea. The patient is advised to follow   up with his primary care physician with   subsequent referral to pulmonologist for evaluation for sleep studies. 5. Hypertension. We will resume preadmission medication. We will   monitor the patient's blood pressure closely. 6. Acute renal failure. This is most likely due to volume depletion. We will carry out hydration with normal saline and monitor the patient's   renal function.     .       Code status: full  DVT prophylaxis: Not indicated. Care Plan discussed with: Patient/Family and Nurse  Disposition: [facility]     Hospital Problems  Date Reviewed: 4/15/2017          Codes Class Noted POA    Essential hypertension ICD-10-CM: I10  ICD-9-CM: 401.9  4/16/2017 Unknown        Myocardial infarction [facility]) ICD-10-CM: I21.3  ICD-9-CM: 410.90  4/14/2017 Unknown        STEMI (ST elevation myocardial infarction) [facility]) ICD-10-CM: I21.3  ICD-9-CM: 410.90  4/14/2017 Unknown                Review of Systems:   A comprehensive review of systems was negative except for that written in the HPI. Vital Signs:    Last 24hrs VS reviewed since prior progress note.  Most recent are:  Visit Vitals    /84 (BP 1 Location: Right arm, BP Patient Position: At rest)    Pulse 81    Temp 97.9 °F (36.6 °C)    Resp 16    Ht 5' 7\" (1.702 m)    Wt 89.8 kg (197 lb 15.6 oz)    SpO2 97%    BMI 31.01 kg/m2         Intake/Output Summary (Last 24 hours) at 04/16/17 1430  Last data filed at 04/16/17 1240   Gross per 24 hour   Intake             1270 ml   Output             3575 ml   Net            -2305 ml        Physical Examination:          Constitutional:  No acute distress, cooperative, pleasant    ENT:  Oral mucous moist, oropharynx benign. Neck supple,   Eyes: pupils appear round, and bilaterally equal and symetrical.     Resp:  CTA bilaterally. No wheezing/rhonchi/rales. No accessory muscle use   CV:  Regular rhythm, normal rate, no murmurs, gallops, rubs    GI:  Soft, non distended, non tender. normoactive bowel sounds, no hepatosplenomegaly     Musculoskeletal:  No edema, warm, 2+ pulses throughout    Neurologic:  Moves all extremities. AAOx3, CN II-XII reviewed  Psych:  Normal speech and affect. Data Review:    Review and/or order of clinical lab test      Labs:     Recent Labs      04/15/17   0407  04/14/17   1925   WBC  14.8*  13.1*   HGB  13.8  16.8   HCT  41.2  48.3   PLT  190  246     Recent Labs      04/16/17   0422  04/15/17   0407  04/14/17   1925   NA  143  141  138   K  3.5  3.7  3.5   CL  111*  109*  107   CO2  22  22  19*   BUN  10  9  13   CREA  0.95  1.12  1.76*   GLU  81  161*  177*   CA  8.5  8.1*  9.3     Recent Labs      04/15/17   0407  04/14/17   1925   SGOT  55*  32   ALT  51  46   AP  59  79   TBILI  0.4  0.3   TP  6.2*  7.6   ALB  3.2*  4.0   GLOB  3.0  3.6     No results for input(s): INR, PTP, APTT in the last 72 hours. No lab exists for component: INREXT, INREXT   No results for input(s): FE, TIBC, PSAT, FERR in the last 72 hours. No results found for: FOL, RBCF   No results for input(s): PH, PCO2, PO2 in the last 72 hours.   Recent Labs      04/15/17   0407  04/14/17   1925   CPK   --   192   CKNDX   --   1.4   TROIQ  6.08*  <0.04     Lab Results   Component Value Date/Time    Cholesterol, total 131 04/15/2017 04:07 AM    HDL Cholesterol 19 04/15/2017 04:07 AM    LDL, calculated 70.8 04/15/2017 04:07 AM    Triglyceride 206 04/15/2017 04:07 AM    CHOL/HDL Ratio 6.9 04/15/2017 04:07 AM     Lab Results   Component Value Date/Time    Glucose (POC) 162 04/16/2017 12:28 PM    Glucose (POC) 115 04/16/2017 07:10 AM    Glucose (POC) 85 04/16/2017 04:25 AM    Glucose (POC) 140 04/15/2017 09:49 PM    Glucose (POC) 99 04/15/2017 05:16 PM     No results found for: COLOR, APPRN, SPGRU, REFSG, NAHED, PROTU, GLUCU, [place], BILU, UROU, SHEILA, LEUKU, GLUKE, EPSU, BACTU, WBCU, RBCU, CASTS, UCRY      ______________________________________________________________________  EXPECTED LENGTH OF STAY: - - -  ACTUAL LENGTH OF STAY:           1 Days               Precious Tvaeras MD

## 2017-04-16 NOTE — PROGRESS NOTES
Cardiology Progress Note  4/16/2017 10:40 AM    Admit Date: 4/14/2017    Admit Diagnosis: Myocardial infarction (HCC);STEMI (ST elevation myocardial *      Assessment:     Active Problems:    Myocardial infarction [facility]) (4/14/2017)      STEMI (ST elevation myocardial infarction) ([facility] (4/14/2017)      Essential hypertension (4/16/2017)        Plan:     Hypertension NOS  worse   Increase  Anti-hypertensive Mya Lu MD  [phone]  Cell        Subjective:     Shoaib Friend denies chest pressure/discomfort, dyspnea. He reports symptoms are improved since yesterday. Will increase meds for BP, tentative discharge tomorrow. ROS: negative except as noted above.     Objective:       Visit Vitals    /87 (BP 1 Location: Left arm, BP Patient Position: Sitting)    Pulse (!) 101    Temp 97.8 °F (36.6 °C)    Resp 16    Ht 5' 7\" (1.702 m)    Wt 89.8 kg (197 lb 15.6 oz)    SpO2 96%    BMI 31.01 kg/m2       Current Facility-Administered Medications   Medication Dose Route Frequency    [START ON 4/17/2017] lisinopril (PRINIVIL, ZESTRIL) tablet 10 mg  10 mg Oral DAILY    carvedilol (COREG) tablet 25 mg  25 mg Oral BID    hydroCHLOROthiazide (HYDRODIURIL) tablet 12.5 mg  12.5 mg Oral DAILY    amitriptyline (ELAVIL) tablet 150 mg  150 mg Oral QHS    insulin glargine (LANTUS) injection 40 Units  40 Units SubCUTAneous DAILY    nicotine (NICODERM CQ) 21 mg/24 hr patch 1 Patch  1 Patch TransDERmal DAILY    LORazepam (ATIVAN) tablet 0.5 mg  0.5 mg Oral Q6H PRN    DOPamine (INTROPIN) 800 mg/250 mL (3,200 mcg/mL) infusion  5-20 mcg/kg/min IntraVENous TITRATE    DOPamine (INTROPIN) 800 mg/250 mL (3,200 mcg/mL) infusion  5-20 mcg/kg/min IntraVENous TITRATE    sodium chloride (NS) flush 5-10 mL  5-10 mL IntraVENous Q8H    sodium chloride (NS) flush 5-10 mL  5-10 mL IntraVENous PRN    acetaminophen (TYLENOL) tablet 650 mg  650 mg Oral Q4H PRN    atorvastatin (LIPITOR) tablet 10 mg  10 mg Oral QHS    prasugrel (EFFIENT) tablet 10 mg  10 mg Oral DAILY    aspirin chewable tablet 81 mg  81 mg Oral DAILY    insulin lispro (HUMALOG) injection   SubCUTAneous AC&HS    glucose chewable tablet 16 g  4 Tab Oral PRN    dextrose (D50W) injection syrg 12.5-25 g  12.5-25 g IntraVENous PRN    glucagon (GLUCAGEN) injection 1 mg  1 mg IntraMUSCular PRN    fentaNYL citrate (PF) injection 50 mcg  50 mcg IntraVENous Q4H PRN    zolpidem (AMBIEN) tablet 10 mg  10 mg Oral QHS PRN         Physical Exam:  Visit Vitals    /87 (BP 1 Location: Left arm, BP Patient Position: Sitting)    Pulse (!) 101    Temp 97.8 °F (36.6 °C)    Resp 16    Ht 5' 7\" (1.702 m)    Wt 89.8 kg (197 lb 15.6 oz)    SpO2 96%    BMI 31.01 kg/m2     General Appearance:  Well developed, well nourished,alert and oriented x 3,  individual in no acute distress. Ears/Nose/Mouth/Throat:   Hearing grossly normal.         Neck: Supple. Chest:   Lungs clear to auscultation bilaterally. Cardiovascular:  Regular rate and rhythm, S1, S2 normal, no murmur. Abdomen:   Soft, non-tender, bowel sounds are active. Extremities: No edema bilaterally. Skin: Warm and dry.                Telemetry: normal sinus rhythm    Data Review:     Labs:    Recent Results (from the past 24 hour(s))   GLUCOSE, POC    Collection Time: 04/15/17 11:56 AM   Result Value Ref Range    Glucose (POC) 161 (H) 65 - 100 mg/dL    Performed by Radha White, POC    Collection Time: 04/15/17  5:16 PM   Result Value Ref Range    Glucose (POC) 99 65 - 100 mg/dL    Performed by Elsa Esquivel    GLUCOSE, POC    Collection Time: 04/15/17  9:49 PM   Result Value Ref Range    Glucose (POC) 140 (H) 65 - 100 mg/dL    Performed by Christine Hernandez    BNP    Collection Time: 04/16/17  4:22 AM   Result Value Ref Range    BNP 94 0 - 943 pg/mL   METABOLIC PANEL, BASIC    Collection Time: 04/16/17  4:22 AM   Result Value Ref Range    Sodium 143 136 - 145 mmol/L    Potassium 3.5 3.5 - 5.1 mmol/L    Chloride 111 (H) 97 - 108 mmol/L    CO2 22 21 - 32 mmol/L    Anion gap 10 5 - 15 mmol/L    Glucose 81 65 - 100 mg/dL    BUN 10 6 - 20 MG/DL    Creatinine 0.95 0.70 - 1.30 MG/DL    BUN/Creatinine ratio 11 (L) 12 - 20      GFR est AA >60 >60 ml/min/1.73m2    GFR est non-AA >60 >60 ml/min/1.73m2    Calcium 8.5 8.5 - 10.1 MG/DL   GLUCOSE, POC    Collection Time: 04/16/17  4:25 AM   Result Value Ref Range    Glucose (POC) 85 65 - 100 mg/dL    Performed by [address] [place] [address], POC    Collection Time: 04/16/17  7:10 AM   Result Value Ref Range    Glucose (POC) 115 (H) 65 - 100 mg/dL    Performed by Ha Hoffman MD

## 2017-04-16 NOTE — PROGRESS NOTES
Bedside shift change report given to [place] (oncoming nurse) by Marsha Bray (offgoing nurse). Report included the following information SBAR, Procedure Summary, Intake/Output, MAR and Recent Results.

## 2017-04-16 NOTE — PROGRESS NOTES
1950: Bedside shift change report given to [address] [place] [address] Homar (oncoming nurse) by Wanda Barrett (offgoing nurse). Report included the following information SBAR, Procedure Summary, Intake/Output, MAR, Accordion and Cardiac Rhythm NSR.

## 2017-04-17 ENCOUNTER — HOME HEALTH ADMISSION (OUTPATIENT)
Dept: HOME HEALTH SERVICES | Facility: HOME HEALTH | Age: 57
End: 2017-04-17

## 2017-04-17 VITALS
RESPIRATION RATE: 16 BRPM | OXYGEN SATURATION: 97 % | DIASTOLIC BLOOD PRESSURE: 79 MMHG | TEMPERATURE: 98.1 F | BODY MASS INDEX: 30.24 KG/M2 | WEIGHT: 192.68 LBS | SYSTOLIC BLOOD PRESSURE: 108 MMHG | HEIGHT: 67 IN | HEART RATE: 74 BPM

## 2017-04-17 LAB
ANION GAP BLD CALC-SCNC: 9 MMOL/L (ref 5–15)
ATRIAL RATE: 76 BPM
BNP SERPL-MCNC: 47 PG/ML (ref 0–100)
BUN SERPL-MCNC: 12 MG/DL (ref 6–20)
BUN/CREAT SERPL: 11 (ref 12–20)
CALCIUM SERPL-MCNC: 9.6 MG/DL (ref 8.5–10.1)
CALCULATED P AXIS, ECG09: 45 DEGREES
CALCULATED R AXIS, ECG10: -70 DEGREES
CALCULATED T AXIS, ECG11: -43 DEGREES
CHLORIDE SERPL-SCNC: 106 MMOL/L (ref 97–108)
CO2 SERPL-SCNC: 24 MMOL/L (ref 21–32)
CREAT SERPL-MCNC: 1.08 MG/DL (ref 0.7–1.3)
DIAGNOSIS, 93000: NORMAL
ERYTHROCYTE [DISTWIDTH] IN BLOOD BY AUTOMATED COUNT: 13.7 % (ref 11.5–14.5)
GLUCOSE BLD STRIP.AUTO-MCNC: 252 MG/DL (ref 65–100)
GLUCOSE BLD STRIP.AUTO-MCNC: 96 MG/DL (ref 65–100)
GLUCOSE SERPL-MCNC: 100 MG/DL (ref 65–100)
HCT VFR BLD AUTO: 46.8 % (ref 36.6–50.3)
HGB BLD-MCNC: 16.1 G/DL (ref 12.1–17)
MCH RBC QN AUTO: 32.3 PG (ref 26–34)
MCHC RBC AUTO-ENTMCNC: 34.4 G/DL (ref 30–36.5)
MCV RBC AUTO: 93.8 FL (ref 80–99)
P-R INTERVAL, ECG05: 166 MS
PLATELET # BLD AUTO: 205 K/UL (ref 150–400)
POTASSIUM SERPL-SCNC: 3.5 MMOL/L (ref 3.5–5.1)
Q-T INTERVAL, ECG07: 400 MS
QRS DURATION, ECG06: 88 MS
QTC CALCULATION (BEZET), ECG08: 450 MS
RBC # BLD AUTO: 4.99 M/UL (ref 4.1–5.7)
SERVICE CMNT-IMP: ABNORMAL
SERVICE CMNT-IMP: NORMAL
SODIUM SERPL-SCNC: 139 MMOL/L (ref 136–145)
VENTRICULAR RATE, ECG03: 76 BPM
WBC # BLD AUTO: 8.9 K/UL (ref 4.1–11.1)

## 2017-04-17 PROCEDURE — 85027 COMPLETE CBC AUTOMATED: CPT | Performed by: SPECIALIST

## 2017-04-17 PROCEDURE — 74011636637 HC RX REV CODE- 636/637: Performed by: SPECIALIST

## 2017-04-17 PROCEDURE — 82962 GLUCOSE BLOOD TEST: CPT

## 2017-04-17 PROCEDURE — 74011636637 HC RX REV CODE- 636/637: Performed by: INTERNAL MEDICINE

## 2017-04-17 PROCEDURE — C1769 GUIDE WIRE: HCPCS

## 2017-04-17 PROCEDURE — 77030013744

## 2017-04-17 PROCEDURE — 77030013715 HC INFL SYS MRTM -B

## 2017-04-17 PROCEDURE — C1874 STENT, COATED/COV W/DEL SYS: HCPCS

## 2017-04-17 PROCEDURE — 36415 COLL VENOUS BLD VENIPUNCTURE: CPT | Performed by: SPECIALIST

## 2017-04-17 PROCEDURE — C1760 CLOSURE DEV, VASC: HCPCS

## 2017-04-17 PROCEDURE — 80048 BASIC METABOLIC PNL TOTAL CA: CPT | Performed by: SPECIALIST

## 2017-04-17 PROCEDURE — 77030018729 HC ELECTRD DEFIB PAD CARD -B

## 2017-04-17 PROCEDURE — 74011250637 HC RX REV CODE- 250/637: Performed by: INTERNAL MEDICINE

## 2017-04-17 PROCEDURE — C1887 CATHETER, GUIDING: HCPCS

## 2017-04-17 PROCEDURE — 77030004533 HC CATH ANGI DX IMP BSC -B

## 2017-04-17 PROCEDURE — 93005 ELECTROCARDIOGRAM TRACING: CPT

## 2017-04-17 PROCEDURE — 77030005320 HC CATH PACE TEMP STJU -B

## 2017-04-17 PROCEDURE — C1725 CATH, TRANSLUMIN NON-LASER: HCPCS

## 2017-04-17 PROCEDURE — 83880 ASSAY OF NATRIURETIC PEPTIDE: CPT | Performed by: SPECIALIST

## 2017-04-17 PROCEDURE — C1894 INTRO/SHEATH, NON-LASER: HCPCS

## 2017-04-17 PROCEDURE — 74011250637 HC RX REV CODE- 250/637: Performed by: SPECIALIST

## 2017-04-17 RX ORDER — GUAIFENESIN 100 MG/5ML
81 LIQUID (ML) ORAL DAILY
Qty: 90 TAB | Refills: 3 | Status: SHIPPED | OUTPATIENT
Start: 2017-04-17

## 2017-04-17 RX ORDER — PRASUGREL 10 MG/1
10 TABLET, FILM COATED ORAL DAILY
Qty: 30 TAB | Refills: 1 | Status: SHIPPED | OUTPATIENT
Start: 2017-04-17 | End: 2017-06-12 | Stop reason: SDUPTHER

## 2017-04-17 RX ORDER — CARVEDILOL 25 MG/1
25 TABLET ORAL 2 TIMES DAILY
Qty: 60 TAB | Refills: 11 | Status: SHIPPED | OUTPATIENT
Start: 2017-04-17 | End: 2017-06-14 | Stop reason: SDUPTHER

## 2017-04-17 RX ORDER — LISINOPRIL AND HYDROCHLOROTHIAZIDE 10; 12.5 MG/1; MG/1
1 TABLET ORAL DAILY
Qty: 30 TAB | Refills: 11 | Status: SHIPPED | OUTPATIENT
Start: 2017-04-17 | End: 2017-06-14 | Stop reason: SDUPTHER

## 2017-04-17 RX ORDER — ATORVASTATIN CALCIUM 10 MG/1
10 TABLET, FILM COATED ORAL
Qty: 30 TAB | Refills: 11 | Status: SHIPPED | OUTPATIENT
Start: 2017-04-17 | End: 2017-06-14 | Stop reason: SDUPTHER

## 2017-04-17 RX ADMIN — ASPIRIN 81 MG CHEWABLE TABLET 81 MG: 81 TABLET CHEWABLE at 09:08

## 2017-04-17 RX ADMIN — Medication 10 ML: at 06:00

## 2017-04-17 RX ADMIN — HYDROCHLOROTHIAZIDE 12.5 MG: 25 TABLET ORAL at 09:08

## 2017-04-17 RX ADMIN — INSULIN GLARGINE 40 UNITS: 100 INJECTION, SOLUTION SUBCUTANEOUS at 09:05

## 2017-04-17 RX ADMIN — INSULIN LISPRO 5 UNITS: 100 INJECTION, SOLUTION INTRAVENOUS; SUBCUTANEOUS at 13:10

## 2017-04-17 RX ADMIN — PRASUGREL HYDROCHLORIDE 10 MG: 10 TABLET, FILM COATED ORAL at 09:08

## 2017-04-17 RX ADMIN — CARVEDILOL 25 MG: 12.5 TABLET, FILM COATED ORAL at 09:08

## 2017-04-17 RX ADMIN — LISINOPRIL 10 MG: 10 TABLET ORAL at 09:08

## 2017-04-17 RX ADMIN — Medication 5 ML: at 14:00

## 2017-04-17 NOTE — CDMP QUERY
Dr. Kati Pittman,     Please clarify if this patient is being treated/managed for:    =>Cardiogenic shock in the setting of STEMI requiring IVF resuscitation  =>Other Explanation of clinical findings  =>Unable to Determine (no explanation of clinical findings)    The medical record reflects the following clinical findings, treatment, and risk factors:    Risk Factors: 59 y/o pt  Clinical Indicators: diaphoresis, bradycardia, hypotension  Treatment: cardiac cath, IVF bolus, martin, pacemaker      Please clarify and document your clinical opinion in the progress notes and discharge summary including the definitive and/or presumptive diagnosis, (suspected or probable), related to the above clinical findings. Please include clinical findings supporting your diagnosis.     Thank you,   Balbir Daley, [facility]

## 2017-04-17 NOTE — PROGRESS NOTES
Problem: AMI: Day 2  Goal: Discharge Planning  Outcome: Progressing Towards Goal  Cardiac rehab consult

## 2017-04-17 NOTE — CARDIO/PULMONARY
Cardiac Wellness: MI education folder with catheterization brochure and smoking cessation material at the bedside of of Liliya Tamayo. Effient coupon provided. Met with Mr. Honey Bence and his wife for education. Educated using teach back method. Reviewed MI diagnosis definition and purpose of intervention. Identified pertinent CAD risk factors including DM, HTN, and smoking, and encouraged lifestyle modifications. Reviewed importance of medication compliance and follow up appointments with cardiologist.  Discussed purpose of Effient and potential side effects, signs and symptoms of angina, and what to report to physician after discharge. Smoking history assessed. Patient is a 1&1/2PPD  smoker. Emphasized value of cardiac rehab, discussed Cardiac Wellness Program and encouraged enrollment. Mr. Honey Bence is interested in attending, stating he has also been working with a . Will follow up with patient by phone after discharge for participation. Liliya Tamayo and his wife verbalized understanding with questions answered.       Sil Ellis RN

## 2017-04-17 NOTE — PROGRESS NOTES
Patient brought to the ED with c/o chest pain and shortness of breath, patient has ruled in for a STEMI. Care manager met with patient and his wife Mohit Pulido: [phone] to explain role and discuss transitions of care. Patient was independent prior to admission, no previous home health or equipment needs. Patient confirmed his PCP to be Dr Parviz Cervantes and sees him every three months and uses the [facility] as his pharmacy. No financial needs voiced. Offered patient a H2H for MI and he is agreeable. Referral sent through [facility]. care. Steph Piedra RN,CRM  Care Management Interventions  PCP Verified by CM:  Yes (Dr Parviz Cervantes)  [facility] (Criteria: CHF and RRAT>21): No (does not meet criteria)  Transition of Care Consult (CM Consult): Home Health [facility] for MI)  [address] Yes  MyChart Signup: No  Discharge Durable Medical Equipment: No  Physical Therapy Consult: No  Occupational Therapy Consult: No  Speech Therapy Consult: No  Current Support Network: Lives with Spouse (wife Mohit Pulido: [phone])  Confirm Follow Up Transport: Family (wife will transport)  Plan discussed with Pt/Family/Caregiver: Yes  Discharge Location  Discharge Placement: Home with home health

## 2017-04-17 NOTE — PROGRESS NOTES
1930: Bedside and Verbal shift change report given to Ofe Stokes  (oncoming nurse) by Osei An (offgoing nurse). Report included the following information SBAR, Kardex, ED Summary, Procedure Summary, Intake/Output, MAR and Recent Results. 0700: Pt had uneventful shift. This morning EKG was performed with minimal if any changes present. 0730: Bedside and Verbal shift change report given to Doron Fox (oncoming nurse) by Sita Kaiser  (offgoing nurse). Report included the following information SBAR, Kardex, ED Summary, Procedure Summary, Intake/Output, MAR and Recent Results.

## 2017-04-17 NOTE — DISCHARGE INSTRUCTIONS
PLEASE DISCUSS with DR. Mray Mckeon at NEXT visit- plan for duration of EFFIENT. Please do not stop taking EFFIENT without speaking to your cardiologist first.  Rohit Anderson have been prescribed a blood thinner to prevent the development of blood clots. Please notify your cardiologist immediately if you notice blood in your urine or stool, have dark stools, have significant nosebleeds or notice any other unusual bleeding or bruising. Patient Discharge Instructions    Genesis Fournier / [id number] : 1960    Admitted 2017 Discharged: 2017         CARDIAC CATHETERIZATION/ CATH STENT PLACEMENT   DISCHARGE INSTRUCTIONS    If possible, have someone stay with you for the first night. It is normal to feel tired for the first couple of days. Take it easy and follow your physicians instructions on activity. CHECK THE CATHETER INSERTION SITE DAILY:    If bleeding at the cath site occurs, take a clean washcloth and apply direct pressure just above the puncture site for at least 15 minutes. Call 911 immediately if the bleeding is not controlled. Continue to apply direct pressure until an ambulance gets to your location. Do not try to drive yourself or have someone else drive you to the hospital.  Have the ambulance bring you to the emergency room. You may shower 24 hours after your procedure. Gently remove the bandage during showering. Wash with soap and water and pat dry. To prevent infection, keep the groin area/insertion site clean and dry. Do not apply powders, creams, lotions, or ointments to the site for 5 days. You may cover the site with a fresh Band-Aid each day until well healed. You may notice a small lump at the site. This is normal and may last up to 6 weeks. CALL THE PHYSICIAN:  ? If the site becomes red, swollen, or feels warm to the touch, or is healing poorly    ? If you note any large/extending bruise, fever >101.0 or chills  ?  If your extremity has numbness, tingling, discoloration, abnormal swelling, tightness or pain   ? If you have difficulty with urination or develop nausea, vomiting, or severe abdominal pain    ACTIVITY for the first 24-48 hours, or as instructed by your physician:  ? No lifting, pushing or pulling over 10 pounds and no straining the insertion site. Do not lift grocery bags or the garbage can; do not run the vacuum  or  for 7 days. ? You may start walking short distances the day of your procedure. Gradually increase as tolerated each day. Current activity recommendations are 30 minutes of exercise at least 5 days a week. Work up to this as you recover. ? Avoid walking outside in extremes of heat or cold. Walk inside (at home, at the mall, or at a large store) when it is cold and windy or hot and humid. THINGS TO KEEP IN MIND:   ? Do not drive, operate any machinery, or sign any legal documents for 24 hours after your procedure, or as directed by your physician. You must have someone drive you home after your procedure. ? Drink plenty of fluids for 24-48 hours after your procedure to flush the contrast dye from your kidneys. ? Limit the number of times you go up and down the stairs  ? Take rests and pace yourself with activity  ? Be careful and do not strain with bowel movements    MEDICATIONS:  ? Take all medications as prescribed  ? Call your physician if you have any questions  ? Keep an updated list of your medications with you at all times and give a list to your primary physician and pharmacist  ? You may use Tylenol 325mg 1-2 tablets every 6 hours as needed for pain or discomfort, unless otherwise instructed. If you have significant discomfort more than 48 hours after your procedure, please call your physicians office. SIGNS AND SYMPTOMS:  ? Notify your physician for new or recurrent symptoms of chest discomfort, unusual shortness of breath or fatigue.   These could be signs of a problem and should be discussed with your physician. ? For significant chest pain or symptoms of angina not relieved with rest:  if you have been prescribed Nitroglycerin, take as directed (taken under the tongue, one at a time 5 minutes apart for a total of 3 doses, sitting down). If the discomfort is not relieved after the 3rd Nitroglycerin, call 911. If you have not been prescribed Nitroglycerin and your chest discomfort is significant, call 911. Take the ambulance, do not try to drive yourself or have someone else drive you to the hospital.     AFTER CARE:  ? Follow up with your physician as instructed  ? Follow a heart healthy diet with proper portion control, daily stress management, daily exercise, blood pressure and cholesterol control, and smoking cessation. The success of your stent, if you had one placed, and the prevention of future catheterizations heavily depends on your lifestyle changes you make now! ? You may start walking short distances the day of your procedure. Gradually increase as tolerated each day. Current activity recommendations are 30 minutes of exercise at least 5 days a week. Work up to this as you recover. Walk, ride a bike, or choose any other activity you enjoy to reach this activity goal.   ? Avoid walking outside in extremes of heat or cold. Walk inside (at home, at the mall, or at a large store) when it is cold and windy or hot and humid. ? If you had a stent placed, consider Cardiac Wellness as a resource to help you make the needed lifestyle changes to live a heart healthy lifestyle. Discuss your candidacy with your physician. If you have questions, call your physicians office or the Cardiac Cath Lab at [phone]. The Cath Lab is operational from 6:30 a.m. to 5:00 p.m., Monday through Friday. After hours, notify your physician. [address] [place] [address] can be reached at [phone]. Cardiac Wellness is operational Monday-Thursday 8:30 a.m. to 5:00 p.m. and Friday 8:30 a.m. to 12:00 p.m.       Remember: IN CASE OF BLEEDING: KEEP FIRM PRESSURE ON THE PROCEDURE SITE AND CALL 911 IF NOT CONTROLLED

## 2017-04-17 NOTE — PROGRESS NOTES
1600: Bedside shift change report given to MARU Johnson  (oncoming nurse) by Landen Thrasher RN  (offgoing nurse). Report included the following information SBAR, Kardex, MAR, Accordion and Recent Results. Patient had an uneventful shift. No complaints of pain. 2000: Bedside shift change report given to Emily Meadows RN  (oncoming nurse) by MARU Johnson  (offgoing nurse). Report included the following information SBAR, Kardex, MAR, Accordion and Recent Results.

## 2017-04-17 NOTE — DISCHARGE SUMMARY
Cardiology Discharge Summary     Patient ID:  Ban Renae  [id number]  83 y.o.  1960    Admit Date: 4/14/2017  Discharge Date: 4/17/17    Admitting Physician: Boston Rodriguez MD     Discharge Physician: Boston Rodriguez Md    Admission Diagnoses:   Myocardial infarction [facility])  STEMI (ST elevation myocardial infarction) [facility])    Discharge Diagnoses: Active Problems:    Myocardial infarction [facility]) (4/14/2017)      STEMI (ST elevation myocardial infarction) ([facility] [address] (4/14/2017)      Essential hypertension (4/16/2017)        Discharge Condition: Good    Cardiology Procedures this Admission:  Left heart catheterization with PCI    Consults: Hospitalist    Hospital Course: Ban Renae is a 60 y.o. male who presented on 4/14/17 with a complaint of chest pain. He has no prior history of cardiac disease including CAD. He smokes > 1 pack per day and has DM on insulin. He was working out at [employer] and began having severe chest pain and sweating. He was noted to have STEMI with inferolateral injury pattern on 12 lead EKG and was sent for urgent cardiac cath. He underwent left heart catheterization with the following findings:     100% proximal to mid occlusion large RCA, treated with 3.5 x 18 and 3.5 x 15 Xience Alpine stents good result. LVEF 75% no WMA. LVEDP 20. Initially HR was 34 hypotensive, so  temporary pacemaker inserted via right femoral vein. Integrilin and Angiomax given during case, Angioseal to groin    The patient was monitored in the CCU overnight. A TTE was completed on 4/16/17 with NL EF.  Hospitalist was consulted for blood glucose management. He exhibited ARYAN with creatinine peak of 1.76 on admission but this resolved by day of discharge and creatinine was 1.08. He was chest pain free and HR/BP improved by day of discharge. He received IP cardiac rehab consult. He was ambulatory in hallway on day of discharge without chest pain or SOB.   He was discharged on ASA, beta blocker, ACE-I, HCTZ, statin and Effient. No nitro per Dr. Jenean Kawasaki. Pt will follow up in 4 days with Dr. Jenean Kawasaki. He was instructed to return to hospital immediately if chest pain were to resume. Cardiology Attending:Patient seen and examined. I agree with NP assessment and plans. BP now controlled. Iveth Dill MD 4/18/2017 9:04 AM          Visit Vitals    /79 (BP 1 Location: Left arm, BP Patient Position: At rest)    Pulse 74    Temp 98.1 °F (36.7 °C)    Resp 16    Ht 5' 7\" (1.702 m)    Wt 87.4 kg (192 lb 10.9 oz)    SpO2 97%    BMI 30.18 kg/m2       Physical Exam  Abdomen: soft, non-tender. Bowel sounds normal. No masses,  no organomegaly  Extremities: no LE edema, + PP bilaterally Rt groin site with no hematoma or ecchymosis. Heart: regular rate and rhythm, S1, S2 normal, no murmurs, clicks, rubs or gallops  Lungs: clear to auscultation bilaterally  Neck: supple, symmetrical, trachea midline, no adenopathy, no JVD, no carotid bruits  Neurologic: Grossly normal  Pulses: 2+ and symmetrical    Labs:   Recent Labs      04/17/17   0349  04/15/17   0407  04/14/17   1925   WBC  8.9  14.8*  13.1*   HGB  16.1  13.8  16.8   HCT  46.8  41.2  48.3   PLT  205  190  246     Recent Labs      04/17/17   0349  04/16/17   0422  04/15/17   0407  04/14/17   1925   NA  139  143  141  138   K  3.5  3.5  3.7  3.5   CL  106  111*  109*  107   CO2  24  22  22  19*   GLU  100  81  161*  177*   BUN  12  10  9  13   CREA  1.08  0.95  1.12  1.76*   CA  9.6  8.5  8.1*  9.3   ALB   --    --   3.2*  4.0   SGOT   --    --   55*  32   ALT   --    --   51  46       Recent Labs      04/15/17   0407  04/14/17 1925   TROIQ  6.08*  <0.04   CPK   --   192   CKMB   --   2.7     TTE 4/15/17:  Left ventricle: Systolic function was normal. Ejection fraction was  estimated in the range of 55 % to 60 %. No obvious wall motion  abnormalities identified in the views obtained. Wall thickness was  moderately increased. Pericardium: There was mild thickening. A possible, small pericardial  effusion was identified. EK17: Undetermined rhythm   Low voltage QRS   Inferior injury pattern    ACUTE MI / STEMI   Consider right ventricular involvement in acute inferior infarct     17: Normal sinus rhythm   Left axis deviation   Inferior infarct , age undetermined   Possible Anterior infarct , age undetermined   When compared with ECG of 15-APR-2017 08:05,   Inferior infarct is now present   T wave inversion now evident in Inferior leads   Confirmed by Daniel Queen MD, Jolinda Kawasaki ([id number]) on 2017 9:13:57 AM   CXR: None  CT chest 4/15/17: 1. No evidence of pericardial effusion. 2. Early emphysematous changes. 3. Left adrenal adenoma. .    Disposition: home    Patient Instructions:   Current Discharge Medication List      START taking these medications    Details   aspirin 81 mg chewable tablet Take 1 Tab by mouth daily. Qty: 90 Tab, Refills: 3      atorvastatin (LIPITOR) 10 mg tablet Take 1 Tab by mouth nightly. Qty: 30 Tab, Refills: 11      carvedilol (COREG) 25 mg tablet Take 1 Tab by mouth two (2) times a day. Qty: 60 Tab, Refills: 11      prasugrel (EFFIENT) 10 mg tablet Take 1 Tab by mouth daily. Qty: 30 Tab, Refills: 1      lisinopril-hydroCHLOROthiazide (PRINZIDE, ZESTORETIC) 10-12.5 mg per tablet Take 1 Tab by mouth daily. Qty: 30 Tab, Refills: 11         CONTINUE these medications which have NOT CHANGED    Details   omega-3 acid ethyl esters (LOVAZA) 1 gram capsule Take 2 g by mouth two (2) times a day. amitriptyline (ELAVIL) 75 mg tablet Take 150 mg by mouth nightly. insulin detemir (LEVEMIR FLEXPEN) 100 unit/mL (3 mL) inpn 40 Units by SubCUTAneous route Daily (before breakfast). insulin aspart (NOVOLOG FLEXPEN) 100 unit/mL inpn 8 Units by SubCUTAneous route Before breakfast, lunch, and dinner.          STOP taking these medications       lisinopril (PRINIVIL, ZESTRIL) 2.5 mg tablet Comments:   Reason for Stopping:         metFORMIN (GLUCOPHAGE) 1,000 mg tablet Comments:   Reason for Stopping:         fenofibrate micronized (LOFIBRA) 200 mg capsule Comments:   Reason for Stopping:               Reference discharge instructions provided by nursing for diet and activity. Follow-up with   Future Appointments  Date Time Provider Shara Nicolasa   4/21/2017 2:40 PM Dilia Narayna  [address]     Follow up with PCP for diabetes management, evaluation for TRIPP, follow up of adrenal adenoma seen on CTA chest.   Signed:  Mj Stratton.  TITO Bruce

## 2017-04-18 ENCOUNTER — HOME CARE VISIT (OUTPATIENT)
Dept: HOME HEALTH SERVICES | Facility: HOME HEALTH | Age: 57
End: 2017-04-18

## 2017-04-18 ENCOUNTER — TELEPHONE (OUTPATIENT)
Dept: CARDIAC REHAB | Age: 57
End: 2017-04-18

## 2017-04-18 NOTE — TELEPHONE ENCOUNTER
4/18/2017 Cardiac Wellness: Called Mr. Vanice Bernheim  to discuss participation in the Cardiac Wellness Program following STEMI and stent on 4/14/2017. Left voicemail message.  Isabelle Love RN

## 2017-04-19 ENCOUNTER — TELEPHONE (OUTPATIENT)
Dept: CARDIAC REHAB | Age: 57
End: 2017-04-19

## 2017-04-19 ENCOUNTER — HOME CARE VISIT (OUTPATIENT)
Dept: SCHEDULING | Facility: HOME HEALTH | Age: 57
End: 2017-04-19

## 2017-04-19 PROCEDURE — G0299 HHS/HOSPICE OF RN EA 15 MIN: HCPCS

## 2017-04-21 ENCOUNTER — PATIENT OUTREACH (OUTPATIENT)
Dept: CARDIOLOGY CLINIC | Age: 57
End: 2017-04-21

## 2017-04-21 ENCOUNTER — TELEPHONE (OUTPATIENT)
Dept: CARDIAC REHAB | Age: 57
End: 2017-04-21

## 2017-04-21 ENCOUNTER — OFFICE VISIT (OUTPATIENT)
Dept: CARDIOLOGY CLINIC | Age: 57
End: 2017-04-21

## 2017-04-21 VITALS
HEART RATE: 85 BPM | BODY MASS INDEX: 29.77 KG/M2 | WEIGHT: 201 LBS | SYSTOLIC BLOOD PRESSURE: 118 MMHG | HEIGHT: 69 IN | RESPIRATION RATE: 12 BRPM | DIASTOLIC BLOOD PRESSURE: 80 MMHG | OXYGEN SATURATION: 97 %

## 2017-04-21 DIAGNOSIS — Z79.4 CONTROLLED TYPE 2 DIABETES MELLITUS WITHOUT COMPLICATION, WITH LONG-TERM CURRENT USE OF INSULIN (HCC): ICD-10-CM

## 2017-04-21 DIAGNOSIS — I21.11 ST ELEVATION MYOCARDIAL INFARCTION INVOLVING RIGHT CORONARY ARTERY (HCC): Primary | ICD-10-CM

## 2017-04-21 DIAGNOSIS — E11.9 CONTROLLED TYPE 2 DIABETES MELLITUS WITHOUT COMPLICATION, WITH LONG-TERM CURRENT USE OF INSULIN (HCC): ICD-10-CM

## 2017-04-21 RX ORDER — VARENICLINE TARTRATE 1 MG/1
1 TABLET, FILM COATED ORAL DAILY
COMMUNITY
End: 2017-04-25 | Stop reason: SDUPTHER

## 2017-04-21 NOTE — TELEPHONE ENCOUNTER
4/21/2017 Cardiac Wellness: Called Mr. Katharine Patel to remind of intake appointment on Tuesday April 25, 2017. Confirmed appointment with patient. Provided pt with contact information for [facility]. Also, reminded pt to bring a list of current medications, a personal schedule, and to wear comfortable clothes and shoes.  Noberto Cogan

## 2017-04-21 NOTE — PROGRESS NOTES
HISTORY OF PRESENT ILLNESS  Thony Liu is a 60 y.o. male. He presented to [facility] about a week ago with acute inferior wall infarction. He was quite ill with low blood pressure and bradycardia and required a temporary pacemaker initially, as well as large amounts of fluids and pressors to maintain a blood pressure. He had two large drug eluting stents placed to his right coronary artery with a good result. He had no other significant coronary disease and his left ventricular function was normal.  His hospital course was essentially uncomplicated. He was seen in consultation by the hospitalist service for management of his diabetes. He had been on Metformin and it had been stopped. He is scheduled to have an interview with cardiac rehab next week. 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 He has now stopped smoking and is taking Chantix and Nicoderm. HPI  Patient Active Problem List   Diagnosis Code    Myocardial infarction ([facility] [address] I21.3    STEMI (ST elevation myocardial infarction) ([facility] [address] I21.3    Essential hypertension I10     Current Outpatient Prescriptions   Medication Sig Dispense Refill    varenicline (CHANTIX) 1 mg tablet Take 1 mg by mouth daily.  nicotine (NICODERM CQ) 21 mg/24 hr 1 Patch by TransDERmal route every twenty-four (24) hours.  aspirin 81 mg chewable tablet Take 1 Tab by mouth daily. 90 Tab 3    atorvastatin (LIPITOR) 10 mg tablet Take 1 Tab by mouth nightly. 30 Tab 11    carvedilol (COREG) 25 mg tablet Take 1 Tab by mouth two (2) times a day. 60 Tab 11    prasugrel (EFFIENT) 10 mg tablet Take 1 Tab by mouth daily. 30 Tab 1    lisinopril-hydroCHLOROthiazide (PRINZIDE, ZESTORETIC) 10-12.5 mg per tablet Take 1 Tab by mouth daily. 30 Tab 11    omega-3 acid ethyl esters (LOVAZA) 1 gram capsule Take 2 g by mouth two (2) times a day.       amitriptyline (ELAVIL) 75 mg tablet Take 75 mg by mouth nightly.  insulin detemir (LEVEMIR FLEXPEN) 100 unit/mL (3 mL) inpn 40 Units by SubCUTAneous route Daily (before breakfast).  insulin aspart (NOVOLOG FLEXPEN) 100 unit/mL inpn 8 Units by SubCUTAneous route Before breakfast, lunch, and dinner. Past Medical History:   Diagnosis Date    CAD (coronary artery disease)     Diabetes ([facility] [address]     Myocardial infarction ([facility] [address]      Past Surgical History:   Procedure Laterality Date    HX CORONARY STENT PLACEMENT      HX HEART CATHETERIZATION      HX PTCA         Review of Systems   Musculoskeletal: Positive for joint pain. All other systems reviewed and are negative. Visit Vitals    /80 (BP 1 Location: Left arm, BP Patient Position: Sitting)    Pulse 85    Resp 12    Ht 5' 9\" (1.753 m)    Wt 201 lb (91.2 kg)    SpO2 97%    BMI 29.68 kg/m2       Physical Exam   Constitutional: He is oriented to person, place, and time. He appears well-nourished. HENT:   Head: Atraumatic. Eyes: Conjunctivae are normal.   Neck: Neck supple. Cardiovascular: Normal rate, regular rhythm and normal heart sounds. Exam reveals no gallop and no friction rub. No murmur heard. Pulmonary/Chest: Breath sounds normal. He has no wheezes. Abdominal: Bowel sounds are normal.   Musculoskeletal: He exhibits no edema. Neurological: He is oriented to person, place, and time. Skin: Skin is dry. Nursing note and vitals reviewed. ASSESSMENT and PLAN  He is doing quite well at present. He's not been treated in the past for hypertension, but was quite hypertensive in the hospital and his pressure is managed well now on multiple medications. Once he starts cardiac rehab he may have a further reduction in blood pressure and some medications may have to be discontinued. In this regard I will see him back in six weeks time.

## 2017-04-21 NOTE — PROGRESS NOTES
This note will not be viewable in [address] Nurse navigator note - cardiology  Attempt to reach pt - post discharge from [facility] - visit April 14-17 - with EMS presentation for chest pain - emergent cath - results below - Dr. Daniel Queen. Diagnosis STEMI/   Cath results - Findings: 100% proximal to mid occlusion large RCA, treated with 3.5 x 18 and 3.5 x 15 Xience Alpine stents good result. LVEF 75% no WMA. LVEDP 20. Initially HR was 34 hypotensive, so temporary pacemaker inserted via right femoral vein. Pt has had Home health nurse visit and notes were reviewed -     Plan: Review medications/ once again -    Review reason for Coreg, asa, Lipitor, Prinzide, and Effient - with precautions re: Effient -    Pt continues on diabetes regimen with Levemir and Novolog -    Stopped medications were lisinopril 2.5, and Metformin 1,000    Site check with office visit - cath and temp pacer site -    Cardiac rehab - Pt has intake appt for 4/25   MD on call 24/7 at [phone]   Smoking cessation - information given   Sleep apnea - sleep study - seen mentioned in notes   Metformin - stopped on AVS - restart? 2. Type 2 diabetes. We will resume basal insulin. The patient will also   be placed on sliding scale with insulin coverage. Hemoglobin A1c is 7.4 on 4/14/17.  We will hold oral agents to the time of discharge from the hospital.     BNP 47  0 - 100 pg/mL Final   4/17/2017  4:44 AM - Shaun, Lab In [facility]   Component Results   Component Value Flag Ref Range Units Status   Sodium 139  136 - 145 mmol/L Final   Potassium 3.5  3.5 - 5.1 mmol/L Final   Chloride 106  97 - 108 mmol/L Final   CO2 24  21 - 32 mmol/L Final   Anion gap 9  5 - 15 mmol/L Final   Glucose 100  65 - 100 mg/dL Final   BUN 12  6 - 20 MG/DL Final   Creatinine 1.08  0.70 - 1.30 MG/DL Final   BUN/Creatinine ratio 11 (L) 12 - 20   Final   GFR est AA >60  >60 ml/min/1.73m2 Final   GFR est non-AA >60  >60 ml/min/1.73m2 Final   Calcium 9.6  8.5 - 10.1 4/17/2017  4:15 AM - Shaun, Lab In [facility]   Component Results   Component Value Flag Ref Range Units Status   WBC 8.9  4.1 - 11.1 K/uL Final   RBC 4.99  4. 10 - 5.70 M/uL Final   HGB 16.1  12.1 - 17.0 g/dL Final   HCT 46.8  36.6 - 50.3 % Final   MCV 93.8  80.0 - 99.0 FL Final   MCH 32.3  26.0 - 34.0 PG Final   MCHC 34.4  30.0 - 36.5 g/dL Final   RDW 13.7  11.5 - 14.5 % Final   PLATELET 145  805 - 540 K/uL Final     4/15/2017  4:51 AM - Shaun, Lab In [facility]   Component Results   Component Value Flag Ref Range Units Status   Hemoglobin A1c 7.4 (H) 4.2 - 6.3 % Final   _________________________________________________________________________________________  Emergency room physician note -  HPI Comments: 60 y.o. male with no significant past medical history who presents via EMS with chief complaint of chest pain. EMS personnel report picking up the patient from his home for a complaint of chest pain with onset at ~1830 \"this evening. \" They report giving the patient \"four\" Aspirin (81 mg) en route to the ED. They report the patient's systolic blood pressure was \"in the 60s. \" Patient reports chest pain, SOB, nausea, dizziness, shoulder pain, and arm pain with onset at ~1830. Patient reports his last meal was at ~1330 \"this afternoon. \" Patient reports working out at the gym and then going home; reports his symptoms began after going home.  ___________________________________________________________________________________________  Cardiology consultation - and procedure - Dr. Brown Box:  STEMI      Recommendations:  cardiac catheterization  Melina Dominguez MD  [phone] Prachi Tariq is a 60 y.o. male who presents with a complaint of chest pain. He has no prior history of cardiac disease including CAD. He smokes > 1 pack per day and has DM on insulin. He was working out at [employer] and began having severe chest pain and sweating. At present the patient is unchanged since initial presentation.  Initial therapy in the ED has included O2. The patient is being admitted for urgent cath.     Patient: Rafael Munoz  MRN: [id number]  SSN: [id number]   YOB: 1960 Age: 60 y.o. Sex: male    Date of Procedure: 4/14/2017   Pre-procedure Diagnosis: STEMI  Post-procedure Diagnosis: Coronary Artery Disease  Procedure: Left Heart Cath, PCI and temporary pacemaker insertion  :  Dr. Celestia Meigs, MD     Findings: 100% proximal to mid occlusion large RCA, treated with 3.5 x 18 and 3.5 x 15 Xience Alpine stents good result. LVEF 75% no WMA. LVEDP 20. Initially HR was 34 hypotensive, so temporary pacemaker inserted via right femoral vein. Integrilin and Angiomax given during case, Angioseal to groin.     Signed by: Celestia Meigs, MD 4/14/2017 9:03 PM  ______________________________________________________________________________________    Roseanna Rios RN , Adela Hartman, [facility]   [facility]  [phone]

## 2017-04-21 NOTE — MR AVS SNAPSHOT
Visit Information Date & Time Provider Department Dept. Phone Encounter #  
 4/21/2017  2:40 PM Paulo Kc MD [facility] [place] [phone] [id number] Upcoming Health Maintenance Date Due Hepatitis C Screening 1960 DTaP/Tdap/Td series (1 - Tdap) 10/18/1979 FOBT Q 1 YEAR AGE 50-75 10/18/2008 INFLUENZA AGE 9 TO ADULT 8/1/2016 Allergies as of 4/21/2017  Review Complete On: 4/21/2017 By: Angela Roland LPN Severity Noted Reaction Type Reactions Fentanyl High 04/15/2017    Anxiety Patient developed severe agitation and anxiety after administration during a cardiac cath. Current Immunizations  Never Reviewed No immunizations on file. Not reviewed this visit Vitals BP Pulse Resp Height(growth percentile) Weight(growth percentile) SpO2  
 118/80 (BP 1 Location: Left arm, BP Patient Position: Sitting) 85 12 5' 9\" (1.753 m) 201 lb (91.2 kg) 97% BMI Smoking Status 29.68 kg/m2 Unknown If Ever Smoked Vitals History BMI and BSA Data Body Mass Index Body Surface Area  
 29.68 kg/m 2 2.11 m 2 Preferred Pharmacy Pharmacy Name Phone [phone] Your Updated Medication List  
  
   
This list is accurate as of: 4/21/17  3:18 PM.  Always use your most recent med list.  
  
  
  
  
 amitriptyline 75 mg tablet Commonly known as:  ELAVIL Take 75 mg by mouth nightly. aspirin 81 mg chewable tablet Take 1 Tab by mouth daily. atorvastatin 10 mg tablet Commonly known as:  LIPITOR Take 1 Tab by mouth nightly. carvedilol 25 mg tablet Commonly known as:  Colette Stable Take 1 Tab by mouth two (2) times a day. CHANTIX 1 mg tablet Generic drug:  varenicline Take 1 mg by mouth daily. LEVEMIR FLEXPEN 100 unit/mL (3 mL) Inpn Generic drug:  insulin detemir 40 Units by SubCUTAneous route Daily (before breakfast). lisinopril-hydroCHLOROthiazide 10-12.5 mg per tablet Commonly known as:  Lei Serum Take 1 Tab by mouth daily. nicotine 21 mg/24 hr  
Commonly known as:  NICODERM CQ  
1 Patch by TransDERmal route every twenty-four (24) hours. NovoLOG Flexpen 100 unit/mL Inpn Generic drug:  insulin aspart  
8 Units by SubCUTAneous route Before breakfast, lunch, and dinner. omega-3 acid ethyl esters 1 gram capsule Commonly known as:  Pino Alt Take 2 g by mouth two (2) times a day. prasugrel 10 mg tablet Commonly known as:  EFFIENT Take 1 Tab by mouth daily. To-Do List   
 04/25/2017 1:00 PM  
  Appointment with Redd Murrell RN at [address] [place] [address] ([phone])  
  
 04/25/2017 2:30 PM  
  Appointment with Demetrius Hodgkins at [address] [place] [address] ([phone]) [facility]! Dear Julio César Love: Thank you for requesting a [employer] account. Our records indicate that you already have an active [employer] account. You can access your account anytime at [email or web address] tok tok Did you know that you can access your hospital and ER discharge instructions at any time in [employer]? You can also review all of your test results from your hospital stay or ER visit. Additional Information If you have questions, please visit the Frequently Asked Questions section of the [employer] website at [email or web address] tok [email or web address] Remember, [employer] is NOT to be used for urgent needs. For medical emergencies, dial 911. Now available from your iPhone and Android! Please provide this summary of care documentation to your next provider. Your primary care clinician is listed as Haleigh Hurst. If you have any questions after today's visit, please call [phone].

## 2017-04-25 ENCOUNTER — HOSPITAL ENCOUNTER (OUTPATIENT)
Dept: CARDIAC REHAB | Age: 57
Discharge: HOME | End: 2017-04-25
Payer: COMMERCIAL

## 2017-04-25 VITALS — WEIGHT: 195 LBS | HEIGHT: 70 IN | BODY MASS INDEX: 27.92 KG/M2

## 2017-04-25 VITALS — BODY MASS INDEX: 28.38 KG/M2 | WEIGHT: 195 LBS

## 2017-04-25 DIAGNOSIS — F17.200 SMOKING: Primary | ICD-10-CM

## 2017-04-25 PROCEDURE — 93798 PHYS/QHP OP CAR RHAB W/ECG: CPT

## 2017-04-25 RX ORDER — VARENICLINE TARTRATE 1 MG/1
TABLET, FILM COATED ORAL
Qty: 30 TAB | Refills: 0 | Status: SHIPPED | OUTPATIENT
Start: 2017-04-25 | End: 2018-04-13

## 2017-04-25 NOTE — TELEPHONE ENCOUNTER
Requested Prescriptions     Signed Prescriptions Disp Refills    varenicline (CHANTIX) 1 mg tablet 30 Tab 0     Sig: Take according to starter pack directions.      Authorizing Provider: Leidy Awad     Ordering User: Isabell Garrido    Per Dr. Kaur [place] verbal order

## 2017-04-25 NOTE — CARDIO/PULMONARY
Keven Dwyer  60 y.o. Mr. Braulio Encarnacion presented to cardiac wellness for orientation and exercise tolerance test today with a primary diagnosis of a STEMI and 2 stents. Keven Dwyer has no significant cardiac history. Cardiac risk factors include smoking/ tobacco exposure, family history, dyslipidemia, hypertension, stress, and these were reviewed with the patient. Keven Dwyer is  and lives with his wife. He has a 5year old and a 32year old. 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 REINA-D, depression score, is 11 and this is considered normal. He is trying to quit smoking which was how he delt with stress normally. Patient denied chest pain or SOB during 6 minute walk and was in NSR/ST without ectopy. Keven Dwyer will attend a 60 minute class once a week and exercise 3 days a week in cardiac wellness. EF is 55-60%.      Nona Ricci RN  4/25/2017

## 2017-05-01 ENCOUNTER — TELEPHONE (OUTPATIENT)
Dept: CARDIOLOGY CLINIC | Age: 57
End: 2017-05-01

## 2017-05-01 ENCOUNTER — HOSPITAL ENCOUNTER (OUTPATIENT)
Dept: CARDIAC REHAB | Age: 57
Discharge: HOME | End: 2017-05-01
Payer: COMMERCIAL

## 2017-05-01 VITALS — WEIGHT: 195 LBS | BODY MASS INDEX: 28.38 KG/M2

## 2017-05-01 PROCEDURE — 93798 PHYS/QHP OP CAR RHAB W/ECG: CPT

## 2017-05-01 NOTE — TELEPHONE ENCOUNTER
Meli with Dr. Keisha Montes office called regarding pts metformin, they would like to know if patient can start back taking medication. They can be reached at [phone]. Thanks!

## 2017-05-01 NOTE — TELEPHONE ENCOUNTER
MD Herminio Noriega, RN        Caller: Unspecified (Today,  8:50 AM)                     No         Patient is okay to take metformin, per Dr. Tavo Rosas. I left a voicemail for Meli requesting call back. 5/2/7- called the office again. Left message with  that patient is okay to restart metformin. She will give Laruae the message.

## 2017-05-01 NOTE — TELEPHONE ENCOUNTER
I reviewed last office note. Metformin had been stopped, but I don't see that Dr. Linda Cabrales canceled it. Dr. Linda Cabrales- Is there any reason patient should not take metformin from your standpoint?

## 2017-05-02 ENCOUNTER — HOSPITAL ENCOUNTER (OUTPATIENT)
Dept: CARDIAC REHAB | Age: 57
Discharge: HOME | End: 2017-05-02
Payer: COMMERCIAL

## 2017-05-02 VITALS — WEIGHT: 197 LBS | BODY MASS INDEX: 28.67 KG/M2

## 2017-05-02 PROCEDURE — 93798 PHYS/QHP OP CAR RHAB W/ECG: CPT

## 2017-05-04 ENCOUNTER — APPOINTMENT (OUTPATIENT)
Dept: CARDIAC REHAB | Age: 57
End: 2017-05-04
Payer: COMMERCIAL

## 2017-05-08 ENCOUNTER — HOSPITAL ENCOUNTER (EMERGENCY)
Age: 57
Discharge: HOME | End: 2017-05-08
Attending: STUDENT IN AN ORGANIZED HEALTH CARE EDUCATION/TRAINING PROGRAM
Payer: COMMERCIAL

## 2017-05-08 ENCOUNTER — APPOINTMENT (OUTPATIENT)
Dept: CT IMAGING | Age: 57
End: 2017-05-08
Attending: PHYSICIAN ASSISTANT
Payer: COMMERCIAL

## 2017-05-08 VITALS
DIASTOLIC BLOOD PRESSURE: 75 MMHG | TEMPERATURE: 97.6 F | HEIGHT: 70 IN | SYSTOLIC BLOOD PRESSURE: 114 MMHG | HEART RATE: 76 BPM | RESPIRATION RATE: 18 BRPM | OXYGEN SATURATION: 94 % | BODY MASS INDEX: 28.72 KG/M2 | WEIGHT: 200.62 LBS

## 2017-05-08 DIAGNOSIS — R10.32 ABDOMINAL PAIN, LLQ (LEFT LOWER QUADRANT): Primary | ICD-10-CM

## 2017-05-08 LAB
ALBUMIN SERPL BCP-MCNC: 3.7 G/DL (ref 3.5–5)
ALBUMIN/GLOB SERPL: 0.9 {RATIO} (ref 1.1–2.2)
ALP SERPL-CCNC: 94 U/L (ref 45–117)
ALT SERPL-CCNC: 40 U/L (ref 12–78)
ANION GAP BLD CALC-SCNC: 9 MMOL/L (ref 5–15)
APPEARANCE UR: CLEAR
AST SERPL W P-5'-P-CCNC: 25 U/L (ref 15–37)
BACTERIA URNS QL MICRO: NEGATIVE /HPF
BASOPHILS # BLD AUTO: 0 K/UL (ref 0–0.1)
BASOPHILS # BLD: 0 % (ref 0–1)
BILIRUB SERPL-MCNC: 0.3 MG/DL (ref 0.2–1)
BILIRUB UR QL: NEGATIVE
BUN SERPL-MCNC: 14 MG/DL (ref 6–20)
BUN/CREAT SERPL: 14 (ref 12–20)
CALCIUM SERPL-MCNC: 9.5 MG/DL (ref 8.5–10.1)
CHLORIDE SERPL-SCNC: 101 MMOL/L (ref 97–108)
CO2 SERPL-SCNC: 26 MMOL/L (ref 21–32)
COLOR UR: ABNORMAL
CREAT SERPL-MCNC: 1.01 MG/DL (ref 0.7–1.3)
EOSINOPHIL # BLD: 0.2 K/UL (ref 0–0.4)
EOSINOPHIL NFR BLD: 2 % (ref 0–7)
EPITH CASTS URNS QL MICRO: ABNORMAL /LPF
ERYTHROCYTE [DISTWIDTH] IN BLOOD BY AUTOMATED COUNT: 12.8 % (ref 11.5–14.5)
GLOBULIN SER CALC-MCNC: 4.1 G/DL (ref 2–4)
GLUCOSE SERPL-MCNC: 197 MG/DL (ref 65–100)
GLUCOSE UR STRIP.AUTO-MCNC: 100 MG/DL
HCT VFR BLD AUTO: 46.2 % (ref 36.6–50.3)
HGB BLD-MCNC: 16.4 G/DL (ref 12.1–17)
HGB UR QL STRIP: NEGATIVE
HYALINE CASTS URNS QL MICRO: ABNORMAL /LPF (ref 0–5)
KETONES UR QL STRIP.AUTO: NEGATIVE MG/DL
LEUKOCYTE ESTERASE UR QL STRIP.AUTO: NEGATIVE
LIPASE SERPL-CCNC: 121 U/L (ref 73–393)
LYMPHOCYTES # BLD AUTO: 26 % (ref 12–49)
LYMPHOCYTES # BLD: 3 K/UL (ref 0.8–3.5)
MCH RBC QN AUTO: 32.7 PG (ref 26–34)
MCHC RBC AUTO-ENTMCNC: 35.5 G/DL (ref 30–36.5)
MCV RBC AUTO: 92.2 FL (ref 80–99)
MONOCYTES # BLD: 1 K/UL (ref 0–1)
MONOCYTES NFR BLD AUTO: 8 % (ref 5–13)
NEUTS SEG # BLD: 7.5 K/UL (ref 1.8–8)
NEUTS SEG NFR BLD AUTO: 64 % (ref 32–75)
NITRITE UR QL STRIP.AUTO: NEGATIVE
PH UR STRIP: 6 [PH] (ref 5–8)
PLATELET # BLD AUTO: 202 K/UL (ref 150–400)
POTASSIUM SERPL-SCNC: 3.9 MMOL/L (ref 3.5–5.1)
PROT SERPL-MCNC: 7.8 G/DL (ref 6.4–8.2)
PROT UR STRIP-MCNC: NEGATIVE MG/DL
RBC # BLD AUTO: 5.01 M/UL (ref 4.1–5.7)
RBC #/AREA URNS HPF: ABNORMAL /HPF (ref 0–5)
SODIUM SERPL-SCNC: 136 MMOL/L (ref 136–145)
SP GR UR REFRACTOMETRY: 1.01 (ref 1–1.03)
UROBILINOGEN UR QL STRIP.AUTO: 0.2 EU/DL (ref 0.2–1)
WBC # BLD AUTO: 11.7 K/UL (ref 4.1–11.1)
WBC URNS QL MICRO: ABNORMAL /HPF (ref 0–4)

## 2017-05-08 PROCEDURE — 85025 COMPLETE CBC W/AUTO DIFF WBC: CPT | Performed by: PHYSICIAN ASSISTANT

## 2017-05-08 PROCEDURE — 74011250636 HC RX REV CODE- 250/636: Performed by: STUDENT IN AN ORGANIZED HEALTH CARE EDUCATION/TRAINING PROGRAM

## 2017-05-08 PROCEDURE — 96374 THER/PROPH/DIAG INJ IV PUSH: CPT

## 2017-05-08 PROCEDURE — 74011636320 HC RX REV CODE- 636/320: Performed by: STUDENT IN AN ORGANIZED HEALTH CARE EDUCATION/TRAINING PROGRAM

## 2017-05-08 PROCEDURE — 99283 EMERGENCY DEPT VISIT LOW MDM: CPT

## 2017-05-08 PROCEDURE — 80053 COMPREHEN METABOLIC PANEL: CPT | Performed by: PHYSICIAN ASSISTANT

## 2017-05-08 PROCEDURE — 81001 URINALYSIS AUTO W/SCOPE: CPT | Performed by: PHYSICIAN ASSISTANT

## 2017-05-08 PROCEDURE — 83690 ASSAY OF LIPASE: CPT | Performed by: PHYSICIAN ASSISTANT

## 2017-05-08 PROCEDURE — 74011250636 HC RX REV CODE- 250/636: Performed by: PHYSICIAN ASSISTANT

## 2017-05-08 PROCEDURE — 96375 TX/PRO/DX INJ NEW DRUG ADDON: CPT

## 2017-05-08 PROCEDURE — 74177 CT ABD & PELVIS W/CONTRAST: CPT

## 2017-05-08 PROCEDURE — 74011000258 HC RX REV CODE- 258: Performed by: STUDENT IN AN ORGANIZED HEALTH CARE EDUCATION/TRAINING PROGRAM

## 2017-05-08 PROCEDURE — 96361 HYDRATE IV INFUSION ADD-ON: CPT

## 2017-05-08 PROCEDURE — 36415 COLL VENOUS BLD VENIPUNCTURE: CPT | Performed by: PHYSICIAN ASSISTANT

## 2017-05-08 RX ORDER — ONDANSETRON 4 MG/1
4 TABLET, ORALLY DISINTEGRATING ORAL
Qty: 12 TAB | Refills: 0 | Status: SHIPPED | OUTPATIENT
Start: 2017-05-08 | End: 2017-05-18

## 2017-05-08 RX ORDER — SODIUM CHLORIDE 0.9 % (FLUSH) 0.9 %
10 SYRINGE (ML) INJECTION
Status: COMPLETED | OUTPATIENT
Start: 2017-05-08 | End: 2017-05-08

## 2017-05-08 RX ORDER — OXYCODONE AND ACETAMINOPHEN 5; 325 MG/1; MG/1
1 TABLET ORAL
Qty: 20 TAB | Refills: 0 | Status: SHIPPED | OUTPATIENT
Start: 2017-05-08 | End: 2019-10-31

## 2017-05-08 RX ORDER — MORPHINE SULFATE 2 MG/ML
6 INJECTION, SOLUTION INTRAMUSCULAR; INTRAVENOUS
Status: COMPLETED | OUTPATIENT
Start: 2017-05-08 | End: 2017-05-08

## 2017-05-08 RX ORDER — ONDANSETRON 4 MG/1
4 TABLET, ORALLY DISINTEGRATING ORAL
Qty: 12 TAB | Refills: 0 | Status: SHIPPED | OUTPATIENT
Start: 2017-05-08 | End: 2017-05-08

## 2017-05-08 RX ORDER — ONDANSETRON 2 MG/ML
4 INJECTION INTRAMUSCULAR; INTRAVENOUS
Status: COMPLETED | OUTPATIENT
Start: 2017-05-08 | End: 2017-05-08

## 2017-05-08 RX ORDER — KETOROLAC TROMETHAMINE 30 MG/ML
30 INJECTION, SOLUTION INTRAMUSCULAR; INTRAVENOUS
Status: COMPLETED | OUTPATIENT
Start: 2017-05-08 | End: 2017-05-08

## 2017-05-08 RX ORDER — OXYCODONE AND ACETAMINOPHEN 5; 325 MG/1; MG/1
1 TABLET ORAL
Qty: 20 TAB | Refills: 0 | Status: SHIPPED | OUTPATIENT
Start: 2017-05-08 | End: 2017-05-08

## 2017-05-08 RX ADMIN — KETOROLAC TROMETHAMINE 30 MG: 30 INJECTION, SOLUTION INTRAMUSCULAR at 06:25

## 2017-05-08 RX ADMIN — SODIUM CHLORIDE 100 ML: 900 INJECTION, SOLUTION INTRAVENOUS at 05:44

## 2017-05-08 RX ADMIN — ONDANSETRON 4 MG: 2 INJECTION INTRAMUSCULAR; INTRAVENOUS at 05:01

## 2017-05-08 RX ADMIN — IOPAMIDOL 100 ML: 755 INJECTION, SOLUTION INTRAVENOUS at 05:44

## 2017-05-08 RX ADMIN — Medication 10 ML: at 05:44

## 2017-05-08 RX ADMIN — Medication 6 MG: at 05:01

## 2017-05-08 RX ADMIN — SODIUM CHLORIDE 1000 ML: 900 INJECTION, SOLUTION INTRAVENOUS at 04:25

## 2017-05-08 NOTE — DISCHARGE INSTRUCTIONS
We hope that we have addressed all of your medical concerns. The examination and treatment you received in the Emergency Department were for an emergent problem and were not intended as complete care. It is important that you follow up with your healthcare provider(s) for ongoing care. If your symptoms worsen or do not improve as expected, and you are unable to reach your usual health care provider(s), you should return to the Emergency Department. Today's healthcare is undergoing tremendous change, and patient satisfaction surveys are one of the many tools to assess the quality of medical care. You may receive a survey from the [employer] regarding your experience in the Emergency Department. I hope that your experience has been completely positive, particularly the medical care that I provided. As such, please participate in the survey; anything less than excellent does not meet my expectations or intentions. [facility] and [facility] participate in nationally recognized quality of care measures. If your blood pressure is greater than 120/80, as reported below, we urge that you seek medical care to address the potential of high blood pressure, commonly known as hypertension. Hypertension can be hereditary or can be caused by certain medical conditions, pain, stress, or \"white coat syndrome. \"       Please make an appointment with your health care provider(s) for follow up of your Emergency Department visit. VITALS:   Patient Vitals for the past 8 hrs:   Temp Pulse Resp BP SpO2   05/08/17 0530 - - - 101/65 94 %   05/08/17 0412 97.9 °F (36.6 °C) 96 18 130/87 96 %          Thank you for allowing us to provide you with medical care today. We realize that you have many choices for your emergency care needs. Please choose us in the future for any continued health care needs. Ursula Frank, [facility].   Office: [phone]            Recent Results (from the past 24 hour(s))   CBC WITH AUTOMATED DIFF    Collection Time: 05/08/17  4:25 AM   Result Value Ref Range    WBC 11.7 (H) 4.1 - 11.1 K/uL    RBC 5.01 4.10 - 5.70 M/uL    HGB 16.4 12.1 - 17.0 g/dL    HCT 46.2 36.6 - 50.3 %    MCV 92.2 80.0 - 99.0 FL    MCH 32.7 26.0 - 34.0 PG    MCHC 35.5 30.0 - 36.5 g/dL    RDW 12.8 11.5 - 14.5 %    PLATELET 436 229 - 869 K/uL    NEUTROPHILS 64 32 - 75 %    LYMPHOCYTES 26 12 - 49 %    MONOCYTES 8 5 - 13 %    EOSINOPHILS 2 0 - 7 %    BASOPHILS 0 0 - 1 %    ABS. NEUTROPHILS 7.5 1.8 - 8.0 K/UL    ABS. LYMPHOCYTES 3.0 0.8 - 3.5 K/UL    ABS. MONOCYTES 1.0 0.0 - 1.0 K/UL    ABS. EOSINOPHILS 0.2 0.0 - 0.4 K/UL    ABS. BASOPHILS 0.0 0.0 - 0.1 K/UL   METABOLIC PANEL, COMPREHENSIVE    Collection Time: 05/08/17  4:25 AM   Result Value Ref Range    Sodium 136 136 - 145 mmol/L    Potassium 3.9 3.5 - 5.1 mmol/L    Chloride 101 97 - 108 mmol/L    CO2 26 21 - 32 mmol/L    Anion gap 9 5 - 15 mmol/L    Glucose 197 (H) 65 - 100 mg/dL    BUN 14 6 - 20 MG/DL    Creatinine 1.01 0.70 - 1.30 MG/DL    BUN/Creatinine ratio 14 12 - 20      GFR est AA >60 >60 ml/min/1.73m2    GFR est non-AA >60 >60 ml/min/1.73m2    Calcium 9.5 8.5 - 10.1 MG/DL    Bilirubin, total 0.3 0.2 - 1.0 MG/DL    ALT (SGPT) 40 12 - 78 U/L    AST (SGOT) 25 15 - 37 U/L    Alk.  phosphatase 94 45 - 117 U/L    Protein, total 7.8 6.4 - 8.2 g/dL    Albumin 3.7 3.5 - 5.0 g/dL    Globulin 4.1 (H) 2.0 - 4.0 g/dL    A-G Ratio 0.9 (L) 1.1 - 2.2     LIPASE    Collection Time: 05/08/17  4:25 AM   Result Value Ref Range    Lipase 121 73 - 393 U/L   URINALYSIS W/MICROSCOPIC    Collection Time: 05/08/17  5:03 AM   Result Value Ref Range    Color YELLOW/STRAW      Appearance CLEAR CLEAR      Specific gravity 1.014 1.003 - 1.030      pH (UA) 6.0 5.0 - 8.0      Protein NEGATIVE  NEG mg/dL    Glucose 100 (A) NEG mg/dL    Ketone NEGATIVE  NEG mg/dL    Bilirubin NEGATIVE  NEG Blood NEGATIVE  NEG      Urobilinogen 0.2 0.2 - 1.0 EU/dL    Nitrites NEGATIVE  NEG      Leukocyte Esterase NEGATIVE  NEG      WBC 0-4 0 - 4 /hpf    RBC 0-5 0 - 5 /hpf    Epithelial cells FEW FEW /lpf    Bacteria NEGATIVE  NEG /hpf    Hyaline cast 0-2 0 - 5 /lpf       Ct Abd Pelv W Cont    Result Date: 5/8/2017  INDICATION: Left lower quadrant abdominal pain since 1:00 PM yesterday. Mild leukocytosis. Normal liver enzymes and serum lipase. COMPARISON: CT chest on 4/15/2017. TECHNIQUE: Following the uneventful intravenous administration of 100 cc Isovue-370, thin axial images were obtained through the abdomen and pelvis. Coronal and sagittal reconstructions were generated. Oral contrast was not administered. CT dose reduction was achieved through use of a standardized protocol tailored for this examination and automatic exposure control for dose modulation. FINDINGS: LUNG BASES: Mild dependent atelectasis. INCIDENTALLY IMAGED HEART AND MEDIASTINUM: Normal size. Coronary artery calcific atherosclerosis. Trace pericardial effusion. LIVER: Hypoattenuation may represent hepatic steatosis. No liver mass. Liver surface is smooth. GALLBLADDER: Unremarkable. SPLEEN: No mass. PANCREAS: No mass or ductal dilatation. ADRENALS: Heterogeneous left adrenal nodule measures 3.4 cm in diameter. No change since last month. Right adrenal gland is within normal limits. KIDNEYS: No mass, calculus, or hydronephrosis. STOMACH: Partially distended with food products. SMALL BOWEL: No dilatation or wall thickening. COLON: Edema surrounds fat anterior to the descending colon in the left. Minimal colonic diverticulosis. APPENDIX: Unremarkable. PERITONEUM: No ascites or pneumoperitoneum. RETROPERITONEUM: Aorta is atherosclerotic without aneurysm. No lymphadenopathy. REPRODUCTIVE ORGANS: Prostate gland is normal in size. URINARY BLADDER: No mass or calculus. BONES: No destructive bone lesion. ADDITIONAL COMMENTS: N/A     IMPRESSION: 1. Descending colon epiploic appendagitis. 2. 3.4 cm left adrenal nodule is unchanged since last month and represents adenoma. 3. Evidence of hepatic steatosis is unchanged. Abdominal Pain: Care Instructions  Your Care Instructions    Abdominal pain has many possible causes. Some aren't serious and get better on their own in a few days. Others need more testing and treatment. If your pain continues or gets worse, you need to be rechecked and may need more tests to find out what is wrong. You may need surgery to correct the problem. Don't ignore new symptoms, such as fever, nausea and vomiting, urination problems, pain that gets worse, and dizziness. These may be signs of a more serious problem. Your doctor may have recommended a follow-up visit in the next 8 to 12 hours. If you are not getting better, you may need more tests or treatment. The doctor has checked you carefully, but problems can develop later. If you notice any problems or new symptoms, get medical treatment right away. Follow-up care is a key part of your treatment and safety. Be sure to make and go to all appointments, and call your doctor if you are having problems. It's also a good idea to know your test results and keep a list of the medicines you take. How can you care for yourself at home? · Rest until you feel better. · To prevent dehydration, drink plenty of fluids, enough so that your urine is light yellow or clear like water. Choose water and other caffeine-free clear liquids until you feel better. If you have kidney, heart, or liver disease and have to limit fluids, talk with your doctor before you increase the amount of fluids you drink. · If your stomach is upset, eat mild foods, such as rice, dry toast or crackers, bananas, and applesauce. Try eating several small meals instead of two or three large ones.   · Wait until 48 hours after all symptoms have gone away before you have spicy foods, alcohol, and drinks that contain caffeine. · Do not eat foods that are high in fat. · Avoid anti-inflammatory medicines such as aspirin, ibuprofen (Advil, Motrin), and naproxen (Aleve). These can cause stomach upset. Talk to your doctor if you take daily aspirin for another health problem. When should you call for help? Call 911 anytime you think you may need emergency care. For example, call if:  · You passed out (lost consciousness). · You pass maroon or very bloody stools. · You vomit blood or what looks like coffee grounds. · You have new, severe belly pain. Call your doctor now or seek immediate medical care if:  · Your pain gets worse, especially if it becomes focused in one area of your belly. · You have a new or higher fever. · Your stools are black and look like tar, or they have streaks of blood. · You have unexpected vaginal bleeding. · You have symptoms of a urinary tract infection. These may include:  ¨ Pain when you urinate. ¨ Urinating more often than usual.  ¨ Blood in your urine. · You are dizzy or lightheaded, or you feel like you may faint. Watch closely for changes in your health, and be sure to contact your doctor if:  · You are not getting better after 1 day (24 hours). Where can you learn more? Go to http://ta-ranjan.info/. Enter Q246 in the search box to learn more about \"Abdominal Pain: Care Instructions. \"  Current as of: May 27, 2016  Content Version: 11.2  © 0396-7403 [employer]. Care instructions adapted under license by [employer] (which disclaims liability or warranty for this information). If you have questions about a medical condition or this instruction, always ask your healthcare professional. [place] [address] any warranty or liability for your use of this information.

## 2017-05-08 NOTE — ED NOTES
Pt discharged from ED with presciptions and follow up care instructions; pt verbalzied understanding. VSS. NAD. Pt reports improved pain of 3/10 at this time. Ambulatory from ED with steady gait. Due to medication administration, pt agreeable to walking the short distance (1 block) to his residence.

## 2017-05-08 NOTE — ED TRIAGE NOTES
Patient arriving c/o LLQ abdominal pain starting at 1 pm yesterday getting progressively worse, stabbing in nature and very tender to the touch. Denies N/V/D.

## 2017-05-08 NOTE — ED PROVIDER NOTES
HPI Comments: 60 y.o. male with past medical history significant for DM, CAD, AMI, sleep apnea, GERD, arthritis, cardiac cath and stents who presents from home with chief complaint of abdominal pain. Pt complains of worsening LLQ abdominal pain that started at 1PM yesterday. Pt describes the pain as \"somebody kicked me\". Pt recalls that he was in the ED for an MI last time. Pt states that he had cardiac stents placed and has been in cardiac rehab. Pt's cardiologist is Dr. Ion Carmona. Pt denies any nausea, vomiting, difficulty urinating or hematuria. There are no other acute medical concerns at this time. PCP: Carina James MD  Cardiologist: Dr. Ion Carmona    Note written by Georgi Sanders, as dictated by GUNNAR Nayak 4:18 AM        The history is provided by the patient. No  was used. Past Medical History:   Diagnosis Date    Arthritis     thumbs, bilateral    CAD (coronary artery disease) 04/14/2017    Diabetes ([facility] [address]     GERD (gastroesophageal reflux disease)     occasionally    Myocardial infarction ([address] 04/14/2017    Sleep apnea        Past Surgical History:   Procedure Laterality Date    HX HEART CATHETERIZATION  04/14/2017    2 stent    HX HEENT      wisdom teeth removed    HX VASECTOMY           Family History:   Problem Relation Age of Onset    Heart Disease Mother      CABG    Stroke Mother     Hypertension Mother     Heart Disease Father      CABG    Cancer Father      bone    Hypertension Father     Sleep Apnea Sister        Social History     Social History    Marital status:      Spouse name: N/A    Number of children: N/A    Years of education: N/A     Occupational History    Not on file.      Social History Main Topics    Smoking status: Former Smoker     Quit date: 4/14/2017    Smokeless tobacco: Not on file    Alcohol use Not on file      Comment: very rarely    Drug use: Not on file    Sexual activity: Not on file     Other Topics Concern    Not on file     Social History Narrative         ALLERGIES: Fentanyl    Review of Systems   Constitutional: Negative. HENT: Negative for ear discharge. Eyes: Negative for photophobia, pain, discharge and visual disturbance. Respiratory: Negative for apnea, cough, chest tightness and shortness of breath. Cardiovascular: Negative for chest pain, palpitations and leg swelling. Gastrointestinal: Positive for abdominal pain. Negative for abdominal distention, blood in stool, nausea and vomiting. Genitourinary: Negative for difficulty urinating, dysuria, flank pain, frequency and hematuria. Musculoskeletal: Negative for back pain, gait problem, joint swelling, myalgias and neck pain. Skin: Negative for color change and pallor. Neurological: Negative for dizziness, syncope, weakness, numbness and headaches. Psychiatric/Behavioral: Negative for behavioral problems and confusion. The patient is not nervous/anxious. All other systems reviewed and are negative. Vitals:    05/08/17 0412   BP: 130/87   Pulse: 96   Resp: 18   Temp: 97.9 °F (36.6 °C)   SpO2: 96%   Weight: 91 kg (200 lb 9.9 oz)   Height: 5' 10\" (1.778 m)            Physical Exam   Constitutional: He is oriented to person, place, and time. He appears well-developed and well-nourished. He appears distressed (mild). HENT:   Head: Normocephalic and atraumatic. Right Ear: External ear normal.   Left Ear: External ear normal.   Nose: Nose normal.   Mouth/Throat: Oropharynx is clear and moist.   Eyes: Conjunctivae and EOM are normal. Pupils are equal, round, and reactive to light. Right eye exhibits no discharge. Left eye exhibits no discharge. Neck: Normal range of motion. Neck supple. Cardiovascular: Normal rate, regular rhythm, normal heart sounds and intact distal pulses. Pulmonary/Chest: Effort normal and breath sounds normal.   Abdominal: Soft. Bowel sounds are normal. He exhibits no distension.  There is tenderness (LLQ). There is no rebound and no guarding. Musculoskeletal: Normal range of motion. He exhibits no edema or tenderness. Neurological: He is alert and oriented to person, place, and time. No cranial nerve deficit. Coordination normal.   Skin: Skin is warm and dry. No rash noted. Psychiatric: He has a normal mood and affect. His behavior is normal. Judgment and thought content normal.   Nursing note and vitals reviewed. Note written by Muriel Severs, Scribe, as dictated by GUNNAR Chan 4:18 AM        MDM  Number of Diagnoses or Management Options  Abdominal pain, LLQ (left lower quadrant):      Amount and/or Complexity of Data Reviewed  Clinical lab tests: ordered  Tests in the radiology section of CPT®: ordered  Discuss the patient with other providers: yes      ED Course       Procedures    Pt signed out to Dr Donna Bentley at change of shift. GUNNAR Landa

## 2017-05-11 ENCOUNTER — HOSPITAL ENCOUNTER (OUTPATIENT)
Dept: CARDIAC REHAB | Age: 57
Discharge: HOME | End: 2017-05-11
Payer: COMMERCIAL

## 2017-05-11 VITALS — WEIGHT: 200 LBS | BODY MASS INDEX: 28.7 KG/M2

## 2017-05-11 PROCEDURE — 93798 PHYS/QHP OP CAR RHAB W/ECG: CPT | Performed by: DIETITIAN, REGISTERED

## 2017-05-11 PROCEDURE — 93797 PHYS/QHP OP CAR RHAB WO ECG: CPT | Performed by: DIETITIAN, REGISTERED

## 2017-05-15 ENCOUNTER — HOSPITAL ENCOUNTER (OUTPATIENT)
Dept: CARDIAC REHAB | Age: 57
Discharge: HOME | End: 2017-05-15
Payer: COMMERCIAL

## 2017-05-15 VITALS — BODY MASS INDEX: 28.55 KG/M2 | WEIGHT: 199 LBS

## 2017-05-15 PROCEDURE — 93798 PHYS/QHP OP CAR RHAB W/ECG: CPT

## 2017-05-18 ENCOUNTER — HOSPITAL ENCOUNTER (OUTPATIENT)
Dept: CARDIAC REHAB | Age: 57
Discharge: HOME | End: 2017-05-18
Payer: COMMERCIAL

## 2017-05-18 VITALS — BODY MASS INDEX: 28.41 KG/M2 | WEIGHT: 198 LBS

## 2017-05-18 PROCEDURE — 93798 PHYS/QHP OP CAR RHAB W/ECG: CPT | Performed by: DIETITIAN, REGISTERED

## 2017-05-18 PROCEDURE — 93797 PHYS/QHP OP CAR RHAB WO ECG: CPT | Performed by: DIETITIAN, REGISTERED

## 2017-05-23 ENCOUNTER — HOSPITAL ENCOUNTER (OUTPATIENT)
Dept: CARDIAC REHAB | Age: 57
Discharge: HOME | End: 2017-05-23
Payer: COMMERCIAL

## 2017-05-23 VITALS — BODY MASS INDEX: 28.12 KG/M2 | WEIGHT: 196 LBS

## 2017-05-23 PROCEDURE — 93798 PHYS/QHP OP CAR RHAB W/ECG: CPT

## 2017-05-25 ENCOUNTER — HOSPITAL ENCOUNTER (OUTPATIENT)
Dept: CARDIAC REHAB | Age: 57
Discharge: HOME | End: 2017-05-25
Payer: COMMERCIAL

## 2017-05-25 VITALS — WEIGHT: 193 LBS | BODY MASS INDEX: 27.69 KG/M2

## 2017-05-25 PROCEDURE — 93797 PHYS/QHP OP CAR RHAB WO ECG: CPT

## 2017-05-25 PROCEDURE — 93798 PHYS/QHP OP CAR RHAB W/ECG: CPT | Performed by: DIETITIAN, REGISTERED

## 2017-05-30 ENCOUNTER — HOSPITAL ENCOUNTER (OUTPATIENT)
Dept: CARDIAC REHAB | Age: 57
Discharge: HOME | End: 2017-05-30
Payer: COMMERCIAL

## 2017-05-30 VITALS — BODY MASS INDEX: 28.12 KG/M2 | WEIGHT: 196 LBS

## 2017-05-30 PROCEDURE — 93798 PHYS/QHP OP CAR RHAB W/ECG: CPT

## 2017-06-01 ENCOUNTER — HOSPITAL ENCOUNTER (OUTPATIENT)
Dept: CARDIAC REHAB | Age: 57
Discharge: HOME | End: 2017-06-01
Payer: COMMERCIAL

## 2017-06-01 VITALS — WEIGHT: 197 LBS | BODY MASS INDEX: 28.27 KG/M2

## 2017-06-01 PROCEDURE — 93798 PHYS/QHP OP CAR RHAB W/ECG: CPT | Performed by: DIETITIAN, REGISTERED

## 2017-06-01 PROCEDURE — 93797 PHYS/QHP OP CAR RHAB WO ECG: CPT

## 2017-06-02 ENCOUNTER — OFFICE VISIT (OUTPATIENT)
Dept: CARDIOLOGY CLINIC | Age: 57
End: 2017-06-02

## 2017-06-02 VITALS
SYSTOLIC BLOOD PRESSURE: 110 MMHG | OXYGEN SATURATION: 98 % | WEIGHT: 200 LBS | HEIGHT: 70 IN | DIASTOLIC BLOOD PRESSURE: 70 MMHG | BODY MASS INDEX: 28.63 KG/M2 | HEART RATE: 88 BPM | RESPIRATION RATE: 16 BRPM

## 2017-06-02 DIAGNOSIS — E11.9 CONTROLLED TYPE 2 DIABETES MELLITUS WITHOUT COMPLICATION, WITH LONG-TERM CURRENT USE OF INSULIN (HCC): ICD-10-CM

## 2017-06-02 DIAGNOSIS — I25.10 CORONARY ARTERY DISEASE INVOLVING NATIVE CORONARY ARTERY OF NATIVE HEART WITHOUT ANGINA PECTORIS: Primary | ICD-10-CM

## 2017-06-02 DIAGNOSIS — Z79.4 CONTROLLED TYPE 2 DIABETES MELLITUS WITHOUT COMPLICATION, WITH LONG-TERM CURRENT USE OF INSULIN (HCC): ICD-10-CM

## 2017-06-02 DIAGNOSIS — I21.11 ST ELEVATION MYOCARDIAL INFARCTION INVOLVING RIGHT CORONARY ARTERY (HCC): ICD-10-CM

## 2017-06-02 DIAGNOSIS — I10 ESSENTIAL HYPERTENSION: ICD-10-CM

## 2017-06-02 NOTE — MR AVS SNAPSHOT
Visit Information Date & Time Provider Department Dept. Phone Encounter #  
 6/2/2017  3:20 PM Yannick Pedro MD [facility] [address] [phone] [id number] Upcoming Health Maintenance Date Due Hepatitis C Screening 1960 DTaP/Tdap/Td series (1 - Tdap) 10/18/1979 FOBT Q 1 YEAR AGE 50-75 10/18/2008 INFLUENZA AGE 9 TO ADULT 8/1/2017 Allergies as of 6/2/2017  Review Complete On: 6/2/2017 By: Maggy Parkinson Severity Noted Reaction Type Reactions Fentanyl High 04/15/2017    Anxiety Patient developed severe agitation and anxiety after administration during a cardiac cath. Current Immunizations  Never Reviewed No immunizations on file. Not reviewed this visit Vitals BP Pulse Resp Height(growth percentile) Weight(growth percentile) SpO2  
 110/70 (BP 1 Location: Left arm, BP Patient Position: Sitting) 88 16 5' 10\" (1.778 m) 200 lb (90.7 kg) 98% BMI Smoking Status 28.7 kg/m2 Former Smoker BMI and BSA Data Body Mass Index Body Surface Area 28.7 kg/m 2 2.12 m 2 Preferred Pharmacy Pharmacy Name Phone [phone] Your Updated Medication List  
  
   
This list is accurate as of: 6/2/17  3:32 PM.  Always use your most recent med list.  
  
  
  
  
 amitriptyline 75 mg tablet Commonly known as:  ELAVIL Take 75 mg by mouth nightly. aspirin 81 mg chewable tablet Take 1 Tab by mouth daily. atorvastatin 10 mg tablet Commonly known as:  LIPITOR Take 1 Tab by mouth nightly. carvedilol 25 mg tablet Commonly known as:  Maryfrances Ghazi Take 1 Tab by mouth two (2) times a day. LEVEMIR FLEXPEN 100 unit/mL (3 mL) Inpn Generic drug:  insulin detemir 40 Units by SubCUTAneous route Daily (before breakfast). lisinopril-hydroCHLOROthiazide 10-12.5 mg per tablet Commonly known as:  Yolanda Bounds Take 1 Tab by mouth daily. NovoLOG Flexpen 100 unit/mL Inpn Generic drug:  insulin aspart  
8 Units by SubCUTAneous route Before breakfast, lunch, and dinner. omega-3 acid ethyl esters 1 gram capsule Commonly known as:  Cherie Coupe Take 2 g by mouth two (2) times a day. oxyCODONE-acetaminophen 5-325 mg per tablet Commonly known as:  PERCOCET Take 1 Tab by mouth every four (4) hours as needed for Pain. Max Daily Amount: 6 Tabs. prasugrel 10 mg tablet Commonly known as:  EFFIENT Take 1 Tab by mouth daily. varenicline 1 mg tablet Commonly known as:  Rozella Brome Take according to starter pack directions.   
  
  
  
  
To-Do List   
 06/05/2017 1:00 PM  
  Appointment with [address] at [address] [place] [address] ([phone])  
  
 06/06/2017 10:00 AM  
  Appointment with [address] at [address] [place] [address] ([phone])  
  
 06/08/2017 8:30 AM  
  Appointment with [address] at [address] [place] [address] ([phone])  
  
 06/08/2017 9:30 AM  
  Appointment with [address] at [address] [place] [address] ([phone])  
  
 06/12/2017 1:00 PM  
  Appointment with [address] at [address] [place] [address] ([phone])  
  
 06/13/2017 10:00 AM  
  Appointment with [address] at [address] [place] [address] ([phone])  
  
 06/15/2017 8:30 AM  
  Appointment with [address] at [address] [place] [address] ([phone])  
  
 06/15/2017 8:30 AM  
  Appointment with [address] at [address] [place] [address] ([phone])  
  
 06/15/2017 9:30 AM  
  Appointment with [address] at [address] [place] [address] ([phone])  
  
 06/19/2017 1:00 PM  
  Appointment with [address] at [address] [place] [address] ([phone])  
  
 06/20/2017 10:00 AM  
 Appointment with [address] at [address] [place] [address] ([phone])  
  
 06/22/2017 8:30 AM  
  Appointment with [address] at [address] [place] [address] ([phone])  
  
 06/22/2017 8:30 AM  
  Appointment with [address] at [address] [place] [address] ([phone])  
  
 06/22/2017 9:30 AM  
  Appointment with [address] at [address] [place] [address] ([phone])  
  
 06/26/2017 1:00 PM  
  Appointment with [address] at [address] [place] [address] ([phone])  
  
 06/27/2017 10:00 AM  
  Appointment with [address] at [address] [place] [address] ([phone])  
  
 06/29/2017 8:30 AM  
  Appointment with [address] at [address] [place] [address] ([phone])  
  
 06/29/2017 8:30 AM  
  Appointment with [address] at [address] [place] [address] ([phone])  
  
 06/29/2017 9:30 AM  
  Appointment with [address] at [address] [place] [address] ([phone])  
  
 07/03/2017 1:00 PM  
  Appointment with [address] at [address] [place] [address] ([phone])  
  
 07/06/2017 8:30 AM  
  Appointment with [address] at [address] [place] [address] ([phone])  
  
 07/06/2017 8:30 AM  
  Appointment with [address] at [address] [place] [address] ([phone])  
  
 07/06/2017 9:30 AM  
  Appointment with [address] at [address] [place] [address] ([phone])  
  
 07/10/2017 1:00 PM  
  Appointment with [address] at [address] [place] [address] ([phone])  
  
 07/11/2017 10:00 AM  
  Appointment with [address] at [address] [place] [address] ([phone])  
  
 07/13/2017 8:30 AM  
  Appointment with [address] at [address] [place] [address] ([phone])  
  
 07/13/2017 8:30 AM  
  Appointment with [address] at [address] [place] [address] ([phone])  
  
 07/13/2017 9:30 AM  
 Appointment with [address] at [address] [place] [address] ([phone])  
  
 07/17/2017 1:00 PM  
  Appointment with [address] at [address] [place] [address] ([phone])  
  
 07/18/2017 10:00 AM  
  Appointment with [address] at [address] [place] [address] ([phone])  
  
 07/20/2017 8:30 AM  
  Appointment with [address] at [address] [place] [address] ([phone])  
  
 07/20/2017 8:30 AM  
  Appointment with [address] at [address] [place] [address] ([phone])  
  
 07/20/2017 9:30 AM  
  Appointment with [address] at [address] [place] [address] ([phone]) [facility] SERVICES! Dear Payal Gomez: Thank you for requesting a [employer] account. Our records indicate that you already have an active [employer] account. You can access your account anytime at [email or web address] Did you know that you can access your hospital and ER discharge instructions at any time in [employer]? You can also review all of your test results from your hospital stay or ER visit. Additional Information If you have questions, please visit the Frequently Asked Questions section of the [employer] website at [email or web address] Remember, [employer] is NOT to be used for urgent needs. For medical emergencies, dial 911. Now available from your iPhone and Android! Please provide this summary of care documentation to your next provider. Your primary care clinician is listed as Thierry Patrick. If you have any questions after today's visit, please call [phone].

## 2017-06-02 NOTE — PROGRESS NOTES
HISTORY OF PRESENT ILLNESS  Genesis Fournier is a 60 y.o. male. He is now approximately six weeks after his inferior wall myocardial infarction treated with stents and a temporary pacemaker. He is no longer smoking. He is in cardiac rehab and doing fairly well. He is no longer having any chest pains. HPI  Patient Active Problem List   Diagnosis Code    [facility] ([facility] I21.3    STEMI (ST elevation myocardial infarction) ([facility] I21.3    Essential hypertension I10     Current Outpatient Prescriptions   Medication Sig Dispense Refill    oxyCODONE-acetaminophen (PERCOCET) 5-325 mg per tablet Take 1 Tab by mouth every four (4) hours as needed for Pain. Max Daily Amount: 6 Tabs. 20 Tab 0    varenicline (CHANTIX) 1 mg tablet Take according to starter pack directions. 30 Tab 0    aspirin 81 mg chewable tablet Take 1 Tab by mouth daily. 90 Tab 3    atorvastatin (LIPITOR) 10 mg tablet Take 1 Tab by mouth nightly. 30 Tab 11    carvedilol (COREG) 25 mg tablet Take 1 Tab by mouth two (2) times a day. 60 Tab 11    prasugrel (EFFIENT) 10 mg tablet Take 1 Tab by mouth daily. 30 Tab 1    lisinopril-hydroCHLOROthiazide (PRINZIDE, ZESTORETIC) 10-12.5 mg per tablet Take 1 Tab by mouth daily. 30 Tab 11    omega-3 acid ethyl esters (LOVAZA) 1 gram capsule Take 2 g by mouth two (2) times a day.  amitriptyline (ELAVIL) 75 mg tablet Take 75 mg by mouth nightly.  insulin detemir (LEVEMIR FLEXPEN) 100 unit/mL (3 mL) inpn 40 Units by SubCUTAneous route Daily (before breakfast).  insulin aspart (NOVOLOG FLEXPEN) 100 unit/mL inpn 8 Units by SubCUTAneous route Before breakfast, lunch, and dinner.        Past Medical History:   Diagnosis Date    Arthritis     thumbs, bilateral    CAD (coronary artery disease) 04/14/2017    Diabetes ([facility]     GERD (gastroesophageal reflux disease)     occasionally    Myocardial infarction ([facility] 04/14/2017    Sleep apnea      Past Surgical History:   Procedure Laterality Date    HX HEART CATHETERIZATION  04/14/2017    2 stent    HX HEENT      wisdom teeth removed    HX VASECTOMY         Review of Systems   Constitutional: Negative. HENT: Negative. Eyes: Negative. Respiratory: Negative. Cardiovascular: Negative. Gastrointestinal: Negative. Musculoskeletal: Negative. Skin: Negative. Neurological: Negative. Endo/Heme/Allergies: Negative. Visit Vitals    /70 (BP 1 Location: Left arm, BP Patient Position: Sitting)    Pulse 88    Resp 16    Ht 5' 10\" (1.778 m)    Wt 200 lb (90.7 kg)    SpO2 98%    BMI 28.7 kg/m2       Physical Exam   Constitutional: He is oriented to person, place, and time. He appears well-nourished. HENT:   Head: Atraumatic. Eyes: Conjunctivae are normal.   Neck: Neck supple. Cardiovascular: Normal rate, regular rhythm and normal heart sounds. Exam reveals no gallop and no friction rub. No murmur heard. Pulmonary/Chest: Breath sounds normal. He has no wheezes. Abdominal: Bowel sounds are normal.   Musculoskeletal: He exhibits no edema. Neurological: He is oriented to person, place, and time. Skin: Skin is dry. Psychiatric: His behavior is normal.   Nursing note and vitals reviewed. ASSESSMENT and PLAN  He seems to be doing well. I will continue his current regimen and see him back in four months.

## 2017-06-05 ENCOUNTER — HOSPITAL ENCOUNTER (OUTPATIENT)
Dept: CARDIAC REHAB | Age: 57
Discharge: HOME | End: 2017-06-05
Payer: COMMERCIAL

## 2017-06-05 VITALS — WEIGHT: 197 LBS | BODY MASS INDEX: 28.27 KG/M2

## 2017-06-05 PROCEDURE — 93798 PHYS/QHP OP CAR RHAB W/ECG: CPT

## 2017-06-06 ENCOUNTER — HOSPITAL ENCOUNTER (OUTPATIENT)
Dept: CARDIAC REHAB | Age: 57
Discharge: HOME | End: 2017-06-06
Payer: COMMERCIAL

## 2017-06-06 VITALS — WEIGHT: 195 LBS | BODY MASS INDEX: 27.98 KG/M2

## 2017-06-06 PROCEDURE — 93798 PHYS/QHP OP CAR RHAB W/ECG: CPT

## 2017-06-12 ENCOUNTER — HOSPITAL ENCOUNTER (OUTPATIENT)
Dept: CARDIAC REHAB | Age: 57
Discharge: HOME | End: 2017-06-12
Payer: COMMERCIAL

## 2017-06-12 PROCEDURE — 93798 PHYS/QHP OP CAR RHAB W/ECG: CPT

## 2017-06-14 ENCOUNTER — TELEPHONE (OUTPATIENT)
Dept: CARDIOLOGY CLINIC | Age: 57
End: 2017-06-14

## 2017-06-14 DIAGNOSIS — I21.3 ST ELEVATION MYOCARDIAL INFARCTION (STEMI), UNSPECIFIED ARTERY (HCC): Primary | ICD-10-CM

## 2017-06-14 RX ORDER — LISINOPRIL AND HYDROCHLOROTHIAZIDE 10; 12.5 MG/1; MG/1
1 TABLET ORAL DAILY
Qty: 90 TAB | Refills: 2 | Status: SHIPPED | OUTPATIENT
Start: 2017-06-14 | End: 2017-06-15 | Stop reason: SDUPTHER

## 2017-06-14 RX ORDER — CARVEDILOL 25 MG/1
25 TABLET ORAL 2 TIMES DAILY
Qty: 180 TAB | Refills: 2 | Status: SHIPPED | OUTPATIENT
Start: 2017-06-14 | End: 2018-04-13 | Stop reason: SDUPTHER

## 2017-06-14 RX ORDER — PRASUGREL 10 MG/1
10 TABLET, FILM COATED ORAL DAILY
Qty: 90 TAB | Refills: 2 | Status: SHIPPED | OUTPATIENT
Start: 2017-06-14 | End: 2018-04-13 | Stop reason: ALTCHOICE

## 2017-06-14 RX ORDER — ATORVASTATIN CALCIUM 10 MG/1
10 TABLET, FILM COATED ORAL
Qty: 90 TAB | Refills: 2 | Status: SHIPPED | OUTPATIENT
Start: 2017-06-14 | End: 2017-06-15 | Stop reason: SDUPTHER

## 2017-06-14 NOTE — TELEPHONE ENCOUNTER
Requested Prescriptions     Signed Prescriptions Disp Refills    prasugrel (EFFIENT) 10 mg tablet 90 Tab 2     Sig: Take 1 Tab by mouth daily. Authorizing Provider: Mally Chris     Ordering User: Sarah Carranza atorvastatin (LIPITOR) 10 mg tablet 90 Tab 2     Sig: Take 1 Tab by mouth nightly. Authorizing Provider: Mally Chris     Ordering User: Sarah Carranza carvedilol (COREG) 25 mg tablet 180 Tab 2     Sig: Take 1 Tab by mouth two (2) times a day. Authorizing Provider: Mally Chris     Ordering User: Sarah Carranza lisinopril-hydroCHLOROthiazide (PRINZIDE, ZESTORETIC) 10-12.5 mg per tablet 90 Tab 2     Sig: Take 1 Tab by mouth daily.      Authorizing Provider: Mally Chris     Ordering User: Lexus Knight    Per Dr. Ye Parkinson verbal order

## 2017-06-14 NOTE — TELEPHONE ENCOUNTER
Pharmacist would like an ok to fill all of Mr. Canas Loosen prescriptions with Dr. Roseann Sifuentes as a 90 day supply with refills.      Thank you, Kathleen Guerrero

## 2017-06-26 ENCOUNTER — HOSPITAL ENCOUNTER (OUTPATIENT)
Dept: CARDIAC REHAB | Age: 57
Discharge: HOME | End: 2017-06-26
Payer: COMMERCIAL

## 2017-06-26 VITALS — BODY MASS INDEX: 27.84 KG/M2 | WEIGHT: 194 LBS

## 2017-06-26 PROCEDURE — 93798 PHYS/QHP OP CAR RHAB W/ECG: CPT

## 2017-07-03 ENCOUNTER — APPOINTMENT (OUTPATIENT)
Dept: CARDIAC REHAB | Age: 57
End: 2017-07-03
Payer: COMMERCIAL

## 2017-07-18 ENCOUNTER — HOSPITAL ENCOUNTER (OUTPATIENT)
Dept: CARDIAC REHAB | Age: 57
Discharge: HOME | End: 2017-07-18
Payer: COMMERCIAL

## 2017-07-18 VITALS — BODY MASS INDEX: 27.98 KG/M2 | WEIGHT: 195 LBS

## 2017-07-18 PROCEDURE — 93798 PHYS/QHP OP CAR RHAB W/ECG: CPT

## 2017-07-20 ENCOUNTER — HOSPITAL ENCOUNTER (OUTPATIENT)
Dept: CARDIAC REHAB | Age: 57
End: 2017-07-20
Payer: COMMERCIAL

## 2017-08-18 ENCOUNTER — TELEPHONE (OUTPATIENT)
Dept: CARDIAC REHAB | Age: 57
End: 2017-08-18

## 2017-08-18 NOTE — TELEPHONE ENCOUNTER
8/18/2017: Cardiac Wellness: [facility] regarding absence from the Cardiac Wellness Program. Left voicemail message.  Scott Cabrera

## 2017-09-21 ENCOUNTER — TELEPHONE (OUTPATIENT)
Dept: CARDIAC REHAB | Age: 57
End: 2017-09-21

## 2017-09-21 NOTE — TELEPHONE ENCOUNTER
9/21/2017: Cardiac Wellness: [facility] regarding absence from the [facility]iac [facility]ness Program. Discharge from [facility] per patient.  Scott Cabrera

## 2017-10-06 ENCOUNTER — OFFICE VISIT (OUTPATIENT)
Dept: CARDIOLOGY CLINIC | Age: 57
End: 2017-10-06

## 2017-10-06 VITALS
DIASTOLIC BLOOD PRESSURE: 82 MMHG | HEIGHT: 70 IN | SYSTOLIC BLOOD PRESSURE: 124 MMHG | BODY MASS INDEX: 28.32 KG/M2 | RESPIRATION RATE: 16 BRPM | WEIGHT: 197.8 LBS

## 2017-10-06 DIAGNOSIS — I21.11 ST ELEVATION MYOCARDIAL INFARCTION INVOLVING RIGHT CORONARY ARTERY (HCC): ICD-10-CM

## 2017-10-06 DIAGNOSIS — Z79.4 CONTROLLED TYPE 2 DIABETES MELLITUS WITHOUT COMPLICATION, WITH LONG-TERM CURRENT USE OF INSULIN (HCC): ICD-10-CM

## 2017-10-06 DIAGNOSIS — E11.9 CONTROLLED TYPE 2 DIABETES MELLITUS WITHOUT COMPLICATION, WITH LONG-TERM CURRENT USE OF INSULIN (HCC): ICD-10-CM

## 2017-10-06 DIAGNOSIS — I25.10 CORONARY ARTERY DISEASE INVOLVING NATIVE CORONARY ARTERY OF NATIVE HEART WITHOUT ANGINA PECTORIS: Primary | ICD-10-CM

## 2017-10-06 RX ORDER — DOXYCYCLINE HYCLATE 20 MG
1 TABLET ORAL 2 TIMES DAILY
Refills: 3 | COMMUNITY
Start: 2017-09-12 | End: 2019-10-31

## 2017-10-06 RX ORDER — METFORMIN HYDROCHLORIDE 1000 MG/1
1 TABLET ORAL 2 TIMES DAILY
COMMUNITY
Start: 2017-09-05

## 2017-10-06 NOTE — PROGRESS NOTES
HISTORY OF PRESENT ILLNESS  Daniela Santana is a 60 y.o. male. He had an inferior wall myocardial infarction in April complicated by bradycardia and hypotension requiring a temporary pacemaker. He received two drug-eluting stents to his right coronary artery. Ejection fraction was normal.  He did rehab for awhile and stopped smoking. He has diabetes. He fell out of bed recently and hurt his back and neck. Otherwise, he is doing well. HPI  Patient Active Problem List   Diagnosis Code    Myocardial infarction I21.9    [facility]) ([facility] I21.3    Essential hypertension I10     Current Outpatient Prescriptions   Medication Sig Dispense Refill    atorvastatin (LIPITOR) 10 mg tablet Take 1 Tab by mouth nightly. 90 Tab 2    lisinopril-hydroCHLOROthiazide (PRINZIDE, ZESTORETIC) 10-12.5 mg per tablet Take 1 Tab by mouth daily. 90 Tab 2    prasugrel (EFFIENT) 10 mg tablet Take 1 Tab by mouth daily. 90 Tab 2    carvedilol (COREG) 25 mg tablet Take 1 Tab by mouth two (2) times a day. 180 Tab 2    oxyCODONE-acetaminophen (PERCOCET) 5-325 mg per tablet Take 1 Tab by mouth every four (4) hours as needed for Pain. Max Daily Amount: 6 Tabs. 20 Tab 0    aspirin 81 mg chewable tablet Take 1 Tab by mouth daily. 90 Tab 3    omega-3 acid ethyl esters (LOVAZA) 1 gram capsule Take 2 g by mouth two (2) times a day.  amitriptyline (ELAVIL) 75 mg tablet Take 75 mg by mouth nightly.  insulin detemir (LEVEMIR FLEXPEN) 100 unit/mL (3 mL) inpn 48 Units by SubCUTAneous route Daily (before breakfast).  insulin aspart (NOVOLOG FLEXPEN) 100 unit/mL inpn 8 Units by SubCUTAneous route Before breakfast, lunch, and dinner.  doxycycline (PERIOSTAT) 20 mg tablet Take 1 Tab by mouth two (2) times a day. 3    metFORMIN (GLUCOPHAGE) 1,000 mg tablet Take 1 Tab by mouth two (2) times a day.  varenicline (CHANTIX) 1 mg tablet Take according to starter pack directions.  30 Tab 0     Past Medical History:   Diagnosis Date    Arthritis     thumbs, bilateral    CAD (coronary artery disease) 04/14/2017    Diabetes ([facility]     GERD (gastroesophageal reflux disease)     occasionally    Myocardial infarction 04/14/2017    Sleep apnea      Past Surgical History:   Procedure Laterality Date    HX CORONARY STENT PLACEMENT      HX HEART CATHETERIZATION  04/14/2017    2 stent    HX HEENT      wisdom teeth removed    HX PTCA      HX VASECTOMY         Review of Systems   Musculoskeletal: Positive for back pain and neck pain. All other systems reviewed and are negative. Visit Vitals    /82 (BP 1 Location: Left arm, BP Patient Position: Sitting)    Resp 16    Ht 5' 10\" (1.778 m)    Wt 197 lb 12.8 oz (89.7 kg)    BMI 28.38 kg/m2       Physical Exam   Constitutional: He is oriented to person, place, and time. He appears well-nourished. HENT:   Head: Atraumatic. Eyes: Conjunctivae are normal.   Neck: Neck supple. Cardiovascular: Normal rate, regular rhythm and normal heart sounds. Exam reveals no gallop and no friction rub. No murmur heard. Pulmonary/Chest: Breath sounds normal. He has no wheezes. Abdominal: Bowel sounds are normal.   Musculoskeletal: He exhibits no edema. Neurological: He is oriented to person, place, and time. Skin: Skin is dry. Nursing note and vitals reviewed. ASSESSMENT and PLAN  He seems to be doing well. I will continue his regimen and see him in six months. I will consider stopping the Effient at that time and possibly adjusting his blood pressure medications.

## 2017-10-06 NOTE — MR AVS SNAPSHOT
Visit Information Date & Time Provider Department Dept. Phone Encounter #  
 10/6/2017  3:20 PM Cande Hampton MD [facility] Julissa Maldonado [phone] [id number] Upcoming Health Maintenance Date Due Hepatitis C Screening 1960 DTaP/Tdap/Td series (1 - Tdap) 10/18/1979 FOBT Q 1 YEAR AGE 50-75 10/18/2008 INFLUENZA AGE 9 TO ADULT 8/1/2017 Allergies as of 10/6/2017  Review Complete On: 10/6/2017 By: Patricia Enriquez Severity Noted Reaction Type Reactions Fentanyl High 04/15/2017    Anxiety Patient developed severe agitation and anxiety after administration during a cardiac cath. Current Immunizations  Never Reviewed No immunizations on file. Not reviewed this visit Vitals BP Resp Height(growth percentile) Weight(growth percentile) BMI Smoking Status 124/82 (BP 1 Location: Left arm, BP Patient Position: Sitting) 16 5' 10\" (1.778 m) 197 lb 12.8 oz (89.7 kg) 28.38 kg/m2 Former Smoker Vitals History BMI and BSA Data Body Mass Index Body Surface Area  
 28.38 kg/m 2 2.1 m 2 Preferred Pharmacy Pharmacy Name Phone [address] [phone] Your Updated Medication List  
  
   
This list is accurate as of: 10/6/17  3:31 PM.  Always use your most recent med list.  
  
  
  
  
 amitriptyline 75 mg tablet Commonly known as:  ELAVIL Take 75 mg by mouth nightly. aspirin 81 mg chewable tablet Take 1 Tab by mouth daily. atorvastatin 10 mg tablet Commonly known as:  LIPITOR Take 1 Tab by mouth nightly. carvedilol 25 mg tablet Commonly known as:  Sesar Washington Take 1 Tab by mouth two (2) times a day. doxycycline 20 mg tablet Commonly known as:  PERIOSTAT Take 1 Tab by mouth two (2) times a day. LEVEMIR FLEXPEN 100 unit/mL (3 mL) Inpn Generic drug:  insulin detemir 48 Units by SubCUTAneous route Daily (before breakfast). lisinopril-hydroCHLOROthiazide 10-12.5 mg per tablet Commonly known as:  Sacha Jayy Take 1 Tab by mouth daily. metFORMIN 1,000 mg tablet Commonly known as:  GLUCOPHAGE Take 1 Tab by mouth two (2) times a day. NovoLOG Flexpen 100 unit/mL Inpn Generic drug:  insulin aspart  
8 Units by SubCUTAneous route Before breakfast, lunch, and dinner. omega-3 acid ethyl esters 1 gram capsule Commonly known as:  Benedict Bruceton Take 2 g by mouth two (2) times a day. oxyCODONE-acetaminophen 5-325 mg per tablet Commonly known as:  PERCOCET Take 1 Tab by mouth every four (4) hours as needed for Pain. Max Daily Amount: 6 Tabs. prasugrel 10 mg tablet Commonly known as:  EFFIENT Take 1 Tab by mouth daily. varenicline 1 mg tablet Commonly known as:  Nayeli Simmons Take according to starter pack directions. Introducing [facility]! Dear Fawn Shipley: Thank you for requesting a AVIS account. Our records indicate that you already have an active AVIS account. You can access your account anytime at [email or web address] Did you know that you can access your hospital and ER discharge instructions at any time in AVIS? You can also review all of your test results from your hospital stay or ER visit. Additional Information If you have questions, please visit the Frequently Asked Questions section of the AVIS website at [email or web address] Remember, AVIS is NOT to be used for urgent needs. For medical emergencies, dial 911. Now available from your iPhone and Android! Please provide this summary of care documentation to your next provider. Your primary care clinician is listed as Mera Chambers. If you have any questions after today's visit, please call [phone].

## 2017-10-12 NOTE — CARDIO/PULMONARY
Ángel Part  60 y.o. With diagnosis of STEMI and stent, 04/14/17, attended phase II cardiac rehab for 15 monitored sessions from 04/25/17 - 07/18/17. William Sheikh is not able to continue cardiac rehab due to having to take care of his father-in-law. He has been discharged.      Hong Winters, RN  10/12/2017

## 2017-11-09 ENCOUNTER — HOSPITAL ENCOUNTER (OUTPATIENT)
Dept: GENERAL RADIOLOGY | Age: 57
Discharge: HOME | End: 2017-11-09
Attending: INTERNAL MEDICINE
Payer: COMMERCIAL

## 2017-11-09 DIAGNOSIS — M54.2 NECK PAIN: ICD-10-CM

## 2017-11-09 PROCEDURE — 72050 X-RAY EXAM NECK SPINE 4/5VWS: CPT

## 2018-04-13 ENCOUNTER — OFFICE VISIT (OUTPATIENT)
Dept: CARDIOLOGY CLINIC | Age: 58
End: 2018-04-13

## 2018-04-13 VITALS
SYSTOLIC BLOOD PRESSURE: 104 MMHG | BODY MASS INDEX: 28.23 KG/M2 | HEIGHT: 70 IN | WEIGHT: 197.2 LBS | HEART RATE: 90 BPM | OXYGEN SATURATION: 91 % | DIASTOLIC BLOOD PRESSURE: 74 MMHG

## 2018-04-13 DIAGNOSIS — I21.3 ST ELEVATION MYOCARDIAL INFARCTION (STEMI), UNSPECIFIED ARTERY (HCC): ICD-10-CM

## 2018-04-13 DIAGNOSIS — I25.10 CORONARY ARTERY DISEASE INVOLVING NATIVE CORONARY ARTERY OF NATIVE HEART WITHOUT ANGINA PECTORIS: Primary | ICD-10-CM

## 2018-04-13 DIAGNOSIS — G11.9 CEREBELLAR ATAXIA (HCC): ICD-10-CM

## 2018-04-13 RX ORDER — CARVEDILOL 6.25 MG/1
6.25 TABLET ORAL 2 TIMES DAILY WITH MEALS
Qty: 60 TAB | Refills: 11 | Status: SHIPPED | OUTPATIENT
Start: 2018-04-13 | End: 2019-10-31

## 2018-04-13 NOTE — PROGRESS NOTES
HISTORY OF PRESENT ILLNESS  Ronnie Mcgovern is a 59 y.o. male. He had an inferior wall infarction slightly more than a year ago. He is doing well as far as his heart is concerned, but he apparently has cerebellar ataxia which may be getting slightly worse. He has not smoked cigarettes since his heart attack. He does have diabetes. He has had several falling spells. HPI  Patient Active Problem List   Diagnosis Code    Myocardial infarction ([facility] I21.9    STEMI (ST elevation myocardial infarction) ([facility]) I21.3    Essential hypertension I10    Cerebellar ataxia ([facility]) G11.9     Current Outpatient Prescriptions   Medication Sig Dispense Refill    carvedilol (COREG) 6.25 mg tablet Take 1 Tab by mouth two (2) times daily (with meals). 60 Tab 11    doxycycline (PERIOSTAT) 20 mg tablet Take 1 Tab by mouth two (2) times a day. 3    metFORMIN (GLUCOPHAGE) 1,000 mg tablet Take 1 Tab by mouth two (2) times a day.  atorvastatin (LIPITOR) 10 mg tablet Take 1 Tab by mouth nightly. 90 Tab 2    lisinopril-hydroCHLOROthiazide (PRINZIDE, ZESTORETIC) 10-12.5 mg per tablet Take 1 Tab by mouth daily. 90 Tab 2    aspirin 81 mg chewable tablet Take 1 Tab by mouth daily. 90 Tab 3    omega-3 acid ethyl esters (LOVAZA) 1 gram capsule Take 2 g by mouth two (2) times a day.  amitriptyline (ELAVIL) 75 mg tablet Take 75 mg by mouth nightly.  insulin detemir (LEVEMIR FLEXPEN) 100 unit/mL (3 mL) inpn 52 Units by SubCUTAneous route Daily (before breakfast).  insulin aspart (NOVOLOG FLEXPEN) 100 unit/mL inpn 10 Units by SubCUTAneous route Before breakfast, lunch, and dinner.  oxyCODONE-acetaminophen (PERCOCET) 5-325 mg per tablet Take 1 Tab by mouth every four (4) hours as needed for Pain. Max Daily Amount: 6 Tabs.  20 Tab 0     Past Medical History:   Diagnosis Date    Arthritis     thumbs, bilateral    CAD (coronary artery disease) 04/14/2017    Diabetes ([facility]     GERD (gastroesophageal reflux disease) occasionally    Myocardial infarction ([facility] 04/14/2017    Sleep apnea      Past Surgical History:   Procedure Laterality Date    HX CORONARY STENT PLACEMENT      HX HEART CATHETERIZATION  04/14/2017    2 stent    HX HEENT      wisdom teeth removed    HX PTCA      HX VASECTOMY         Review of Systems   Neurological: Positive for tremors and focal weakness. All other systems reviewed and are negative. Visit Vitals    /74 (BP 1 Location: Left arm, BP Patient Position: Sitting)    Pulse 90    Ht 5' 10\" (1.778 m)    Wt 89.4 kg (197 lb 3.2 oz)    SpO2 91%    BMI 28.3 kg/m2       Physical Exam   Constitutional: He is oriented to person, place, and time. He appears well-nourished. HENT:   Head: Atraumatic. Eyes: Conjunctivae are normal.   Neck: Neck supple. Cardiovascular: Normal rate, regular rhythm and normal heart sounds. Exam reveals no gallop and no friction rub. No murmur heard. Pulmonary/Chest: Breath sounds normal. He has no wheezes. Abdominal: Bowel sounds are normal. There is no tenderness. Musculoskeletal: He exhibits no edema. Neurological: He is oriented to person, place, and time. Skin: Skin is dry. Psychiatric: His behavior is normal.   Nursing note and vitals reviewed. ASSESSMENT and PLAN  He never had high blood pressure before his heart attack and it is relatively low now. I will therefore cut way back on Carvedilol from 25 mg twice daily to 6.25 mg twice daily and may stop completely in the future. He will also stop his Effient and continue only on a baby aspirin. I will see him in 6 months. He is going to have neurologic follow up in the near future and possibly another MRI.

## 2018-04-13 NOTE — PROGRESS NOTES
Patient reports recently being diagnosed with ataxia by neurologist Dr. Chayito Guillen with [facility]

## 2018-04-13 NOTE — MR AVS SNAPSHOT
[address] [place] [address] 57 
[phone] Patient: José Miguel Garcia MRN: [id number] :1960 Visit Information Date & Time Provider Department Dept. Phone Encounter #  
 2018  3:00 PM Jin Nelson MD [facility] Dorina Patterson [phone] [id number] Upcoming Health Maintenance Date Due Hepatitis C Screening 1960 FOOT EXAM Q1 10/18/1968 MICROALBUMIN Q1 10/18/1968 EYE EXAM RETINAL OR DILATED Q1 10/18/1968 Pneumococcal 19-64 Medium Risk (1 of 1 - PPSV23) 10/18/1977 DTaP/Tdap/Td series (1 - Tdap) 10/18/1979 FOBT Q 1 YEAR AGE 50-75 10/18/2008 Influenza Age 5 to Adult 2017 HEMOGLOBIN A1C Q6M 10/15/2017 LIPID PANEL Q1 4/15/2018 Allergies as of 2018  Review Complete On: 2018 By: Lori Montez RN Severity Noted Reaction Type Reactions Fentanyl High 04/15/2017    Anxiety Patient developed severe agitation and anxiety after administration during a cardiac cath. Current Immunizations  Never Reviewed No immunizations on file. Not reviewed this visit Vitals BP Pulse Height(growth percentile) Weight(growth percentile) SpO2 BMI  
 104/74 (BP 1 Location: Left arm, BP Patient Position: Sitting) 90 5' 10\" (1.778 m) 197 lb 3.2 oz (89.4 kg) 91% 28.3 kg/m2 Smoking Status Former Smoker BMI and BSA Data Body Mass Index Body Surface Area  
 28.3 kg/m 2 2.1 m 2 Preferred Pharmacy Pharmacy Name Phone [phone]  [phone] Your Updated Medication List  
  
   
This list is accurate as of 18  3:29 PM.  Always use your most recent med list.  
  
  
  
  
 amitriptyline 75 mg tablet Commonly known as:  ELAVIL Take 75 mg by mouth nightly. aspirin 81 mg chewable tablet Take 1 Tab by mouth daily. atorvastatin 10 mg tablet Commonly known as:  LIPITOR Take 1 Tab by mouth nightly. carvedilol 25 mg tablet Commonly known as:  Maybelle Pallas Take 1 Tab by mouth two (2) times a day. doxycycline 20 mg tablet Commonly known as:  PERIOSTAT Take 1 Tab by mouth two (2) times a day. LEVEMIR FLEXPEN 100 unit/mL (3 mL) Inpn Generic drug:  insulin detemir U-100  
52 Units by SubCUTAneous route Daily (before breakfast). lisinopril-hydroCHLOROthiazide 10-12.5 mg per tablet Commonly known as:  Diane Mohs Take 1 Tab by mouth daily. metFORMIN 1,000 mg tablet Commonly known as:  GLUCOPHAGE Take 1 Tab by mouth two (2) times a day. NovoLOG Flexpen U-100 Insulin 100 unit/mL Inpn Generic drug:  insulin aspart U-100  
10 Units by SubCUTAneous route Before breakfast, lunch, and dinner. omega-3 acid ethyl esters 1 gram capsule Commonly known as:  Bro Messing Take 2 g by mouth two (2) times a day. oxyCODONE-acetaminophen 5-325 mg per tablet Commonly known as:  PERCOCET Take 1 Tab by mouth every four (4) hours as needed for Pain. Max Daily Amount: 6 Tabs. prasugrel 10 mg tablet Commonly known as:  EFFIENT Take 1 Tab by mouth daily. Introducing [facility]! Dear Aniyah Pedroza: Thank you for requesting a SpinalMotion account. Our records indicate that you already have an active SpinalMotion account. You can access your account anytime at [email or web address] Did you know that you can access your hospital and ER discharge instructions at any time in SpinalMotion? You can also review all of your test results from your hospital stay or ER visit. Additional Information If you have questions, please visit the Frequently Asked Questions section of the SpinalMotion website at [email or web address] Remember, SpinalMotion is NOT to be used for urgent needs. For medical emergencies, dial 911. Now available from your iPhone and Android! Please provide this summary of care documentation to your next provider. Your primary care clinician is listed as Blas Nixon. If you have any questions after today's visit, please call [phone].

## 2018-06-30 ENCOUNTER — HOSPITAL ENCOUNTER (OUTPATIENT)
Dept: MRI IMAGING | Age: 58
Discharge: HOME | End: 2018-06-30
Attending: PSYCHIATRY & NEUROLOGY
Payer: COMMERCIAL

## 2018-06-30 VITALS — WEIGHT: 195 LBS | BODY MASS INDEX: 27.98 KG/M2

## 2018-06-30 DIAGNOSIS — R26.9 GAIT DISTURBANCE: ICD-10-CM

## 2018-06-30 PROCEDURE — 70553 MRI BRAIN STEM W/O & W/DYE: CPT

## 2018-06-30 PROCEDURE — A9575 INJ GADOTERATE MEGLUMI 0.1ML: HCPCS

## 2018-06-30 PROCEDURE — 74011250636 HC RX REV CODE- 250/636

## 2018-06-30 RX ORDER — GADOTERATE MEGLUMINE 376.9 MG/ML
19 INJECTION INTRAVENOUS
Status: COMPLETED | OUTPATIENT
Start: 2018-06-30 | End: 2018-06-30

## 2018-06-30 RX ADMIN — GADOTERATE MEGLUMINE 19 ML: 376.9 INJECTION INTRAVENOUS at 09:30

## 2018-07-28 DIAGNOSIS — I21.3 ST ELEVATION MYOCARDIAL INFARCTION (STEMI), UNSPECIFIED ARTERY (HCC): ICD-10-CM

## 2018-07-30 RX ORDER — ATORVASTATIN CALCIUM 10 MG/1
TABLET, FILM COATED ORAL
Qty: 90 TAB | Refills: 0 | Status: SHIPPED | OUTPATIENT
Start: 2018-07-30 | End: 2019-10-31

## 2018-07-30 NOTE — TELEPHONE ENCOUNTER
Requested Prescriptions     Signed Prescriptions Disp Refills    atorvastatin (LIPITOR) 10 mg tablet 90 Tab 0     Sig: TAKE 1 TABLET BY MOUTH EVERY NIGHT     Authorizing Provider: Solomon Rodriguez     Ordering User: Mohit Florentino       Per Dr. Nessa Mg  Date Time Provider [facility]   10/10/2018 2:20 PM Kelly Norris  [address]

## 2018-12-01 DIAGNOSIS — I21.3 ST ELEVATION MYOCARDIAL INFARCTION (STEMI), UNSPECIFIED ARTERY (HCC): ICD-10-CM

## 2018-12-03 RX ORDER — LISINOPRIL AND HYDROCHLOROTHIAZIDE 10; 12.5 MG/1; MG/1
TABLET ORAL
Qty: 90 TAB | Refills: 0 | Status: SHIPPED | OUTPATIENT
Start: 2018-12-03 | End: 2019-10-31 | Stop reason: ALTCHOICE

## 2018-12-03 NOTE — TELEPHONE ENCOUNTER
Requested Prescriptions     Signed Prescriptions Disp Refills    lisinopril-hydroCHLOROthiazide (PRINZIDE, ZESTORETIC) 10-12.5 mg per tablet 90 Tab 0     Sig: TAKE 1 TABLET BY MOUTH DAILY     Authorizing Provider: Lazaro Padilla     Ordering User: Elodia Bravo    Per Dr. Leah Crawley verbal order

## 2019-10-31 ENCOUNTER — OFFICE VISIT (OUTPATIENT)
Dept: CARDIOLOGY CLINIC | Age: 59
End: 2019-10-31

## 2019-10-31 VITALS
HEIGHT: 70 IN | WEIGHT: 203.4 LBS | BODY MASS INDEX: 29.12 KG/M2 | SYSTOLIC BLOOD PRESSURE: 128 MMHG | HEART RATE: 127 BPM | DIASTOLIC BLOOD PRESSURE: 80 MMHG | RESPIRATION RATE: 18 BRPM | OXYGEN SATURATION: 97 %

## 2019-10-31 DIAGNOSIS — R00.0 INAPPROPRIATE SINUS TACHYCARDIA: Primary | ICD-10-CM

## 2019-10-31 DIAGNOSIS — G11.9 CEREBELLAR ATAXIA (HCC): ICD-10-CM

## 2019-10-31 DIAGNOSIS — I21.3 ST ELEVATION MYOCARDIAL INFARCTION (STEMI), UNSPECIFIED ARTERY (HCC): ICD-10-CM

## 2019-10-31 DIAGNOSIS — I25.10 CORONARY ARTERY DISEASE INVOLVING NATIVE CORONARY ARTERY OF NATIVE HEART WITHOUT ANGINA PECTORIS: ICD-10-CM

## 2019-10-31 DIAGNOSIS — R00.2 PALPITATIONS: ICD-10-CM

## 2019-10-31 DIAGNOSIS — I10 ESSENTIAL HYPERTENSION: ICD-10-CM

## 2019-10-31 DIAGNOSIS — Z95.5 PRESENCE OF STENT IN RIGHT CORONARY ARTERY: ICD-10-CM

## 2019-10-31 RX ORDER — LISINOPRIL 10 MG/1
10 TABLET ORAL DAILY
Qty: 30 TAB | Refills: 11 | Status: SHIPPED | OUTPATIENT
Start: 2019-10-31

## 2019-10-31 RX ORDER — MELOXICAM 15 MG/1
TABLET ORAL
Refills: 1 | COMMUNITY
Start: 2019-09-20

## 2019-10-31 RX ORDER — LISINOPRIL 10 MG/1
TABLET ORAL
COMMUNITY
Start: 2019-08-15 | End: 2019-10-31 | Stop reason: SDUPTHER

## 2019-10-31 RX ORDER — CARVEDILOL 6.25 MG/1
6.25 TABLET ORAL 2 TIMES DAILY WITH MEALS
Qty: 60 TAB | Refills: 11 | Status: SHIPPED | OUTPATIENT
Start: 2019-10-31 | End: 2019-11-13

## 2019-10-31 RX ORDER — FENOFIBRATE 200 MG/1
CAPSULE ORAL
Refills: 0 | COMMUNITY
Start: 2019-08-23

## 2019-10-31 RX ORDER — ATORVASTATIN CALCIUM 10 MG/1
TABLET, FILM COATED ORAL DAILY
COMMUNITY

## 2019-10-31 NOTE — PROGRESS NOTES
HISTORY OF PRESENT ILLNESS  Emily Calvert is a 62 y.o. male. He has coronary disease and had an inferior wall infarction in April 2017 treated with two stents to the right coronary artery. His course was complicated by bradycardia and needed a temporary pacemaker. A subsequent echocardiogram showed normal LV function. He also has diabetes and progressive cerebellar ataxia. He uses a cane for ambulation. For some reason I have not seen him in the office since April of 2018. He was on carvedilol 6.25 mg twice daily but could not get it refilled from the pharmacy about nine months ago and did not come in. He is followed by a primary care physician who apparently checks his thyroid though on occasion. He has been noting that his heart rate has been 100-120 for several months. His blood pressure ranges between 120 and 160. 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 He also has diabetes on insulin and metformin. HPI  Patient Active Problem List   Diagnosis Code    Myocardial infarction ([facility] [address] I21.9    STEMI (ST elevation myocardial infarction) ([facility] [address] I21.3    Essential hypertension I10    Cerebellar ataxia (HCC) G11.9     Current Outpatient Medications   Medication Sig Dispense Refill    fenofibrate micronized (LOFIBRA) 200 mg capsule TK 1 C PO QD WAC  0    meloxicam (MOBIC) 15 mg tablet TK 1 T PO QD  1    atorvastatin (LIPITOR) 10 mg tablet Take  by mouth daily.  insulin lispro (HUMALOG KWIKPEN INSULIN SC) 30 Units by SubCUTAneous route three (3) times daily.  carvedilol (COREG) 6.25 mg tablet Take 1 Tab by mouth two (2) times daily (with meals). 60 Tab 11    lisinopril (PRINIVIL, ZESTRIL) 10 mg tablet Take 1 Tab by mouth daily. 30 Tab 11    metFORMIN (GLUCOPHAGE) 1,000 mg tablet Take 1 Tab by mouth two (2) times a day.  aspirin 81 mg chewable tablet Take 1 Tab by mouth daily. 90 Tab 3    omega-3 acid ethyl esters (LOVAZA) 1 gram capsule Take 2 g by mouth two (2) times a day.       amitriptyline (ELAVIL) 75 mg tablet Take 150 mg by mouth nightly.  insulin detemir (LEVEMIR FLEXPEN) 100 unit/mL (3 mL) inpn 60 Units by SubCUTAneous route Daily (before breakfast). Past Medical History:   Diagnosis Date    Arthritis     thumbs, bilateral    CAD (coronary artery disease) 04/14/2017    Diabetes ([facility] [address]     GERD (gastroesophageal reflux disease)     occasionally    Myocardial infarction ([facility] [address] 04/14/2017    Sleep apnea      Past Surgical History:   Procedure Laterality Date    HX CORONARY STENT PLACEMENT      HX HEART CATHETERIZATION  04/14/2017    2 stent    HX HEENT      wisdom teeth removed    HX PTCA      HX VASECTOMY         Review of Systems   Cardiovascular: Positive for palpitations. Neurological: Positive for focal weakness. All other systems reviewed and are negative. Visit Vitals  /80 (BP 1 Location: Left arm, BP Patient Position: Sitting)   Pulse (!) 127   Resp 18   Ht 5' 10\" (1.778 m)   Wt 203 lb 6.4 oz (92.3 kg)   SpO2 97%   BMI 29.18 kg/m²       Physical Exam   Constitutional: He is oriented to person, place, and time. He appears well-nourished. HENT:   Head: Atraumatic. Eyes: Conjunctivae are normal.   Neck: Neck supple. Cardiovascular: Regular rhythm and normal heart sounds. Tachycardia present. Exam reveals no gallop and no friction rub. No murmur heard. Pulmonary/Chest: Breath sounds normal. He has no wheezes. He has no rales. Abdominal: Bowel sounds are normal.   Musculoskeletal: He exhibits no edema. Neurological: He is oriented to person, place, and time. No cranial nerve deficit. Skin: Skin is dry. Psychiatric: His behavior is normal.   Nursing note and vitals reviewed. ASSESSMENT and PLAN  EKG in the office showed sinus tachycardia at 117 bpm. He has poor R wave progression in the anterior precordial leads but this is unchanged from a previous EKG.  It is unclear why he has the resting tachycardia although he may have some issue with diabetic neuropathy causing this. I am concerned that this could cause weakening of his LV function over time. For now, I will start him back on his beta blocker, carvedilol 6.25 mg twice daily and will have him return for an echocardiogram in the next several weeks and see him afterwards. He tells me his thyroid function is being followed by his primary care physician.

## 2019-11-13 ENCOUNTER — OFFICE VISIT (OUTPATIENT)
Dept: CARDIOLOGY CLINIC | Age: 59
End: 2019-11-13

## 2019-11-13 VITALS
SYSTOLIC BLOOD PRESSURE: 132 MMHG | WEIGHT: 205 LBS | OXYGEN SATURATION: 96 % | HEART RATE: 105 BPM | HEIGHT: 70 IN | DIASTOLIC BLOOD PRESSURE: 92 MMHG | BODY MASS INDEX: 29.35 KG/M2 | RESPIRATION RATE: 14 BRPM

## 2019-11-13 DIAGNOSIS — R00.0 INAPPROPRIATE SINUS TACHYCARDIA: Primary | ICD-10-CM

## 2019-11-13 DIAGNOSIS — I10 ESSENTIAL HYPERTENSION: ICD-10-CM

## 2019-11-13 DIAGNOSIS — G11.9 CEREBELLAR ATAXIA (HCC): ICD-10-CM

## 2019-11-13 DIAGNOSIS — I21.3 ST ELEVATION MYOCARDIAL INFARCTION (STEMI), UNSPECIFIED ARTERY (HCC): ICD-10-CM

## 2019-11-13 DIAGNOSIS — I25.10 CORONARY ARTERY DISEASE INVOLVING NATIVE CORONARY ARTERY OF NATIVE HEART WITHOUT ANGINA PECTORIS: ICD-10-CM

## 2019-11-13 DIAGNOSIS — Z95.5 PRESENCE OF STENT IN RIGHT CORONARY ARTERY: ICD-10-CM

## 2019-11-13 RX ORDER — CARVEDILOL 12.5 MG/1
12.5 TABLET ORAL 2 TIMES DAILY WITH MEALS
Qty: 180 TAB | Refills: 3 | Status: SHIPPED | OUTPATIENT
Start: 2019-11-13 | End: 2020-12-18 | Stop reason: SDUPTHER

## 2019-11-13 NOTE — PROGRESS NOTES
HISTORY OF PRESENT ILLNESS  Sandra Simmons is a 62 y.o. male. He has coronary disease status post myocardial infarction treated with stents to the right coronary artery. He also has cerebellar ataxia. He was seen recently after an absence and had been off his beta blocker. His heart rate was 117 bpm in sinus tachycardia. He also has diabetes. I started him back on carvedilol which he had taken previously at 6.25 mg twice a day and his heart rate is down to 103 today. An echocardiogram shows normal LV function. There is an echo-free space anterior to the heart suggesting fluid or fat. He does have occasional substernal chest pains which are not frequent. He has not eaten much today and is somewhat clammy in the office suggesting some borderline hypoglycemia. HPI  Patient Active Problem List   Diagnosis Code    Myocardial infarction ([facility] [address] I21.9    STEMI (ST elevation myocardial infarction) ([facility] [address] I21.3    Essential hypertension I10    Cerebellar ataxia (HCC) G11.9     Current Outpatient Medications   Medication Sig Dispense Refill    carvedilol (COREG) 12.5 mg tablet Take 1 Tab by mouth two (2) times daily (with meals). 180 Tab 3    fenofibrate micronized (LOFIBRA) 200 mg capsule TK 1 C PO QD WAC  0    meloxicam (MOBIC) 15 mg tablet TK 1 T PO QD  1    atorvastatin (LIPITOR) 10 mg tablet Take  by mouth daily.  insulin lispro (HUMALOG KWIKPEN INSULIN SC) 30 Units by SubCUTAneous route three (3) times daily.  lisinopril (PRINIVIL, ZESTRIL) 10 mg tablet Take 1 Tab by mouth daily. 30 Tab 11    metFORMIN (GLUCOPHAGE) 1,000 mg tablet Take 1 Tab by mouth two (2) times a day.  aspirin 81 mg chewable tablet Take 1 Tab by mouth daily. 90 Tab 3    omega-3 acid ethyl esters (LOVAZA) 1 gram capsule Take 2 g by mouth two (2) times a day.  amitriptyline (ELAVIL) 75 mg tablet Take 150 mg by mouth nightly.       insulin detemir (LEVEMIR FLEXPEN) 100 unit/mL (3 mL) inpn 60 Units by SubCUTAneous route Daily (before breakfast). Past Medical History:   Diagnosis Date    Arthritis     thumbs, bilateral    CAD (coronary artery disease) 04/14/2017    Diabetes ([facility] [address]     GERD (gastroesophageal reflux disease)     occasionally    Myocardial infarction ([facility] [address] 04/14/2017    Sleep apnea      Past Surgical History:   Procedure Laterality Date    HX CORONARY STENT PLACEMENT      HX HEART CATHETERIZATION  04/14/2017    2 stent    HX HEENT      wisdom teeth removed    HX PTCA      HX VASECTOMY         Review of Systems   Constitutional: Positive for diaphoresis. Neurological: Positive for focal weakness. All other systems reviewed and are negative. Visit Vitals  BP (!) 132/92 (BP 1 Location: Left arm, BP Patient Position: Sitting)   Pulse (!) 105   Resp 14   Ht 5' 10\" (1.778 m)   Wt 205 lb (93 kg)   SpO2 96%   BMI 29.41 kg/m²       Physical Exam   Constitutional: He appears well-nourished. HENT:   Head: Atraumatic. Eyes: Conjunctivae are normal.   Neck: Neck supple. Cardiovascular: Regular rhythm and normal heart sounds. Tachycardia present. Exam reveals no gallop and no friction rub. No murmur heard. Pulmonary/Chest: Breath sounds normal. He has no wheezes. He has no rales. Abdominal: Bowel sounds are normal.   Musculoskeletal: He exhibits no edema. Skin: Skin is dry. Psychiatric: His behavior is normal.   Nursing note and vitals reviewed. ASSESSMENT and PLAN  He is doing better but still mildly tachycardic. It is unclear why he has an echo free space to the heart. There is no evidence for tamponade. I will increase his carvedilol to 12.5 mg twice daily and see him back in two months. A repeat echocardiogram will need to be done at some point.  Apparently his thyroid function is being checked by his internist.

## 2020-12-18 DIAGNOSIS — I10 ESSENTIAL HYPERTENSION: Primary | ICD-10-CM

## 2020-12-18 RX ORDER — CARVEDILOL 12.5 MG/1
12.5 TABLET ORAL 2 TIMES DAILY WITH MEALS
Qty: 180 TAB | Refills: 1 | Status: SHIPPED | OUTPATIENT
Start: 2020-12-18 | End: 2021-07-02

## 2020-12-18 NOTE — TELEPHONE ENCOUNTER
Requested Prescriptions     Signed Prescriptions Disp Refills    carvediloL (COREG) 12.5 mg tablet 180 Tab 1     Sig: Take 1 Tab by mouth two (2) times daily (with meals).      Authorizing Provider: Bobby Barrett     Ordering User: Mike Galeano    Per Dr. Yomi Ogden verbal order

## 2021-07-01 DIAGNOSIS — I10 ESSENTIAL HYPERTENSION: ICD-10-CM

## 2021-07-02 RX ORDER — CARVEDILOL 12.5 MG/1
12.5 TABLET ORAL 2 TIMES DAILY
Qty: 60 TABLET | Refills: 0 | Status: SHIPPED | OUTPATIENT
Start: 2021-07-02 | End: 2021-08-25 | Stop reason: SDUPTHER

## 2021-07-02 NOTE — TELEPHONE ENCOUNTER
Requested Prescriptions     Signed Prescriptions Disp Refills    carvediloL (COREG) 12.5 mg tablet 60 Tablet 0     Sig: Take 1 Tablet by mouth two (2) times a day.  *NEED TO SCHEDULE VIRTUAL OR OFFICE VISIT FOLLOW UP FOR FURTHER REFILLS*     Authorizing Provider: Greg Cornea     Ordering User: Filiberto Eisenmenger    Per Dr. Corey Prieto verbal order

## 2021-08-04 DIAGNOSIS — I10 ESSENTIAL HYPERTENSION: ICD-10-CM

## 2021-08-06 RX ORDER — CARVEDILOL 12.5 MG/1
TABLET ORAL
Qty: 60 TABLET | Refills: 0 | OUTPATIENT
Start: 2021-08-06

## 2021-08-17 DIAGNOSIS — I10 ESSENTIAL HYPERTENSION: ICD-10-CM

## 2021-08-18 RX ORDER — CARVEDILOL 12.5 MG/1
TABLET ORAL
Qty: 60 TABLET | Refills: 0 | OUTPATIENT
Start: 2021-08-18

## 2021-08-18 NOTE — TELEPHONE ENCOUNTER
Requested Prescriptions     Refused Prescriptions Disp Refills    carvediloL (COREG) 12.5 mg tablet [Pharmacy Med Name: CARVEDILOL 12.5MG TABLETS] 60 Tablet 0     Sig: TAKE 1 TABLET BY MOUTH TWICE DAILY.  FOLLOW UP FOR FURTHER REFILLS*     Refused By: Rosy Irving     Reason for Refusal: Appt required, please call patient

## 2021-08-25 ENCOUNTER — OFFICE VISIT (OUTPATIENT)
Dept: CARDIOLOGY CLINIC | Age: 61
End: 2021-08-25
Payer: COMMERCIAL

## 2021-08-25 VITALS
WEIGHT: 199 LBS | DIASTOLIC BLOOD PRESSURE: 88 MMHG | BODY MASS INDEX: 28.49 KG/M2 | OXYGEN SATURATION: 98 % | SYSTOLIC BLOOD PRESSURE: 140 MMHG | HEART RATE: 119 BPM | HEIGHT: 70 IN | RESPIRATION RATE: 22 BRPM

## 2021-08-25 DIAGNOSIS — R00.2 PALPITATIONS: ICD-10-CM

## 2021-08-25 DIAGNOSIS — G11.9 CEREBELLAR ATAXIA (HCC): ICD-10-CM

## 2021-08-25 DIAGNOSIS — I21.3 ST ELEVATION MYOCARDIAL INFARCTION (STEMI), UNSPECIFIED ARTERY (HCC): ICD-10-CM

## 2021-08-25 DIAGNOSIS — Z95.5 PRESENCE OF STENT IN RIGHT CORONARY ARTERY: ICD-10-CM

## 2021-08-25 DIAGNOSIS — R00.0 INAPPROPRIATE SINUS TACHYCARDIA: Primary | ICD-10-CM

## 2021-08-25 DIAGNOSIS — I10 ESSENTIAL HYPERTENSION: ICD-10-CM

## 2021-08-25 DIAGNOSIS — I25.10 CORONARY ARTERY DISEASE INVOLVING NATIVE CORONARY ARTERY OF NATIVE HEART WITHOUT ANGINA PECTORIS: ICD-10-CM

## 2021-08-25 PROCEDURE — 93000 ELECTROCARDIOGRAM COMPLETE: CPT | Performed by: SPECIALIST

## 2021-08-25 PROCEDURE — 99214 OFFICE O/P EST MOD 30 MIN: CPT | Performed by: SPECIALIST

## 2021-08-25 RX ORDER — BLOOD SUGAR DIAGNOSTIC
STRIP MISCELLANEOUS
COMMUNITY
Start: 2021-08-18

## 2021-08-25 RX ORDER — CARVEDILOL 12.5 MG/1
12.5 TABLET ORAL 2 TIMES DAILY
Qty: 180 TABLET | Refills: 3 | Status: SHIPPED | OUTPATIENT
Start: 2021-08-25 | End: 2022-09-06

## 2021-08-25 NOTE — PROGRESS NOTES
HISTORY OF PRESENT ILLNESS  Geraldine Chapman is a 56 y.o. male. He has diabetes and a history of myocardial infarction in 2017 treated with a stent to the right coronary artery. I last saw him in November 2019. He had had some mild inappropriate sinus tachycardia and was placed on carvedilol but he ran out of this medicine a week ago. He also has cerebellar ataxia. An EKG in the office today shows sinus tachycardia at 112 bpm with low voltage. His last echocardiogram in November 2019 showed normal ejection fraction. HPI  Patient Active Problem List   Diagnosis Code    Myocardial infarction ([address] I21.9    STEMI (ST elevation myocardial infarction) ([facility] [address] I21.3    Essential hypertension I10    Cerebellar ataxia (HCC) G11.9     Current Outpatient Medications   Medication Sig Dispense Refill    Accu-Chek SmartView Test Strip strip USE TO CHECK BLOOD SUGAR THREE TIMES DAILY.  carvediloL (COREG) 12.5 mg tablet Take 1 Tablet by mouth two (2) times a day. *NEED TO SCHEDULE VIRTUAL OR OFFICE VISIT FOLLOW UP FOR FURTHER REFILLS* 180 Tablet 3    fenofibrate micronized (LOFIBRA) 200 mg capsule TK 1 C PO QD WAC  0    meloxicam (MOBIC) 15 mg tablet TK 1 T PO QD  1    atorvastatin (LIPITOR) 10 mg tablet Take  by mouth daily.  insulin lispro (HUMALOG KWIKPEN INSULIN SC) 30 Units by SubCUTAneous route three (3) times daily.  lisinopril (PRINIVIL, ZESTRIL) 10 mg tablet Take 1 Tab by mouth daily. 30 Tab 11    metFORMIN (GLUCOPHAGE) 1,000 mg tablet Take 1 Tab by mouth two (2) times a day.  aspirin 81 mg chewable tablet Take 1 Tab by mouth daily. 90 Tab 3    omega-3 acid ethyl esters (LOVAZA) 1 gram capsule Take 2 g by mouth two (2) times a day.  amitriptyline (ELAVIL) 75 mg tablet Take 150 mg by mouth nightly.  insulin detemir (LEVEMIR FLEXPEN) 100 unit/mL (3 mL) inpn 60 Units by SubCUTAneous route Daily (before breakfast).        Past Medical History:   Diagnosis Date    Arthritis     thumbs, bilateral    CAD (coronary artery disease) 04/14/2017    Diabetes ([facility] [address]     GERD (gastroesophageal reflux disease)     occasionally    Myocardial infarction ([facility] [address] 04/14/2017    Sleep apnea      Past Surgical History:   Procedure Laterality Date    HX CORONARY STENT PLACEMENT      HX HEART CATHETERIZATION  04/14/2017    2 stent    HX HEENT      wisdom teeth removed    HX PTCA      HX VASECTOMY         Review of Systems   Cardiovascular: Positive for palpitations. All other systems reviewed and are negative. Visit Vitals  BP (!) 140/88 (BP 1 Location: Left upper arm, BP Patient Position: Sitting, BP Cuff Size: Adult)   Pulse (!) 119   Resp 22   Ht 5' 10\" (1.778 m)   Wt 199 lb (90.3 kg)   SpO2 98%   BMI 28.55 kg/m²       Physical Exam  Vitals and nursing note reviewed. Constitutional:       Appearance: Normal appearance. HENT:      Head: Normocephalic. Right Ear: External ear normal.      Left Ear: External ear normal.      Nose: Nose normal.      Mouth/Throat:      Mouth: Mucous membranes are moist.   Eyes:      Extraocular Movements: Extraocular movements intact. Cardiovascular:      Rate and Rhythm: Regular rhythm. Tachycardia present. Heart sounds: Normal heart sounds. Pulmonary:      Breath sounds: Normal breath sounds. Abdominal:      Palpations: Abdomen is soft. Musculoskeletal:         General: Normal range of motion. Cervical back: Normal range of motion. Skin:     General: Skin is warm. Neurological:      General: No focal deficit present. Mental Status: He is alert. Psychiatric:         Mood and Affect: Mood normal.         ASSESSMENT and PLAN  For whatever reason he does seem to have inappropriate sinus tachycardia. His heart rate has been high at physical therapy. I will restart his carvedilol and his previous dose of 12.5 mg twice daily. He wishes to follow-up on an as-needed basis. He is not having any chest pain.

## 2022-03-19 PROBLEM — I10 ESSENTIAL HYPERTENSION: Status: ACTIVE | Noted: 2017-04-16

## 2022-03-19 PROBLEM — I21.9 MYOCARDIAL INFARCTION (HCC): Status: ACTIVE | Noted: 2017-04-14

## 2022-03-19 PROBLEM — G11.9 CEREBELLAR ATAXIA (HCC): Status: ACTIVE | Noted: 2018-04-13

## 2022-03-19 PROBLEM — I21.3 STEMI (ST ELEVATION MYOCARDIAL INFARCTION) (HCC): Status: ACTIVE | Noted: 2017-04-14

## 2022-09-02 DIAGNOSIS — I10 ESSENTIAL HYPERTENSION: ICD-10-CM

## 2022-09-06 RX ORDER — CARVEDILOL 12.5 MG/1
TABLET ORAL
Qty: 60 TABLET | Refills: 0 | Status: SHIPPED | OUTPATIENT
Start: 2022-09-06 | End: 2022-10-27

## 2022-09-06 NOTE — TELEPHONE ENCOUNTER
Requested Prescriptions     Signed Prescriptions Disp Refills    carvediloL (COREG) 12.5 mg tablet 60 Tablet 0     Sig: TAKE 1 TABLET BY MOUTH TWICE DAILY.  FOLLOW UP FOR FURTHER REFILLS*     Authorizing Provider: Chely Hurley     Ordering User: Jacque Fryr    Per Dr. Janine Wheeler verbal order
Detail Level: Detailed
Detail Level: Generalized

## 2022-10-26 DIAGNOSIS — I10 ESSENTIAL HYPERTENSION: ICD-10-CM

## 2022-10-27 RX ORDER — CARVEDILOL 12.5 MG/1
12.5 TABLET ORAL 2 TIMES DAILY
Qty: 30 TABLET | Refills: 0 | Status: SHIPPED | OUTPATIENT
Start: 2022-10-27

## 2024-04-17 ENCOUNTER — HOSPITAL ENCOUNTER (INPATIENT)
Facility: HOSPITAL | Age: 64
LOS: 4 days | Discharge: HOME HEALTH | DRG: 872 | End: 2024-04-21
Attending: EMERGENCY MEDICINE | Admitting: INTERNAL MEDICINE
Payer: MEDICARE

## 2024-04-17 DIAGNOSIS — M71.121 SEPTIC OLECRANON BURSITIS OF RIGHT ELBOW: Primary | ICD-10-CM

## 2024-04-17 DIAGNOSIS — A41.9 SEPTICEMIA (HCC): ICD-10-CM

## 2024-04-17 LAB
ALBUMIN SERPL-MCNC: 3.5 G/DL (ref 3.5–5)
ALBUMIN/GLOB SERPL: 0.9 (ref 1.1–2.2)
ALP SERPL-CCNC: 61 U/L (ref 45–117)
ALT SERPL-CCNC: 20 U/L (ref 12–78)
ANION GAP SERPL CALC-SCNC: 3 MMOL/L (ref 5–15)
AST SERPL-CCNC: 22 U/L (ref 15–37)
BASOPHILS # BLD: 0 K/UL (ref 0–0.1)
BASOPHILS NFR BLD: 0 % (ref 0–1)
BILIRUB SERPL-MCNC: 0.4 MG/DL (ref 0.2–1)
BUN SERPL-MCNC: 18 MG/DL (ref 6–20)
BUN/CREAT SERPL: 18 (ref 12–20)
CALCIUM SERPL-MCNC: 10 MG/DL (ref 8.5–10.1)
CHLORIDE SERPL-SCNC: 107 MMOL/L (ref 97–108)
CO2 SERPL-SCNC: 24 MMOL/L (ref 21–32)
CREAT SERPL-MCNC: 1 MG/DL (ref 0.7–1.3)
CRP SERPL-MCNC: 7 MG/DL (ref 0–0.3)
DIFFERENTIAL METHOD BLD: ABNORMAL
EOSINOPHIL # BLD: 0.1 K/UL (ref 0–0.4)
EOSINOPHIL NFR BLD: 0 % (ref 0–7)
ERYTHROCYTE [DISTWIDTH] IN BLOOD BY AUTOMATED COUNT: 14.1 % (ref 11.5–14.5)
ERYTHROCYTE [SEDIMENTATION RATE] IN BLOOD: 28 MM/HR (ref 0–20)
GLOBULIN SER CALC-MCNC: 4.1 G/DL (ref 2–4)
GLUCOSE SERPL-MCNC: 181 MG/DL (ref 65–100)
HCT VFR BLD AUTO: 43 % (ref 36.6–50.3)
HGB BLD-MCNC: 14.8 G/DL (ref 12.1–17)
IMM GRANULOCYTES # BLD AUTO: 0.1 K/UL (ref 0–0.04)
IMM GRANULOCYTES NFR BLD AUTO: 0 % (ref 0–0.5)
LACTATE SERPL-SCNC: 2.4 MMOL/L (ref 0.4–2)
LYMPHOCYTES # BLD: 1.4 K/UL (ref 0.8–3.5)
LYMPHOCYTES NFR BLD: 10 % (ref 12–49)
MCH RBC QN AUTO: 32.7 PG (ref 26–34)
MCHC RBC AUTO-ENTMCNC: 34.4 G/DL (ref 30–36.5)
MCV RBC AUTO: 95.1 FL (ref 80–99)
MONOCYTES # BLD: 1.2 K/UL (ref 0–1)
MONOCYTES NFR BLD: 9 % (ref 5–13)
NEUTS SEG # BLD: 10.9 K/UL (ref 1.8–8)
NEUTS SEG NFR BLD: 81 % (ref 32–75)
NRBC # BLD: 0 K/UL (ref 0–0.01)
NRBC BLD-RTO: 0 PER 100 WBC
PLATELET # BLD AUTO: 223 K/UL (ref 150–400)
PMV BLD AUTO: 10.4 FL (ref 8.9–12.9)
POTASSIUM SERPL-SCNC: 3.9 MMOL/L (ref 3.5–5.1)
PROT SERPL-MCNC: 7.6 G/DL (ref 6.4–8.2)
RBC # BLD AUTO: 4.52 M/UL (ref 4.1–5.7)
SODIUM SERPL-SCNC: 134 MMOL/L (ref 136–145)
WBC # BLD AUTO: 13.6 K/UL (ref 4.1–11.1)

## 2024-04-17 PROCEDURE — 85652 RBC SED RATE AUTOMATED: CPT

## 2024-04-17 PROCEDURE — 36415 COLL VENOUS BLD VENIPUNCTURE: CPT

## 2024-04-17 PROCEDURE — 80053 COMPREHEN METABOLIC PANEL: CPT

## 2024-04-17 PROCEDURE — 96361 HYDRATE IV INFUSION ADD-ON: CPT

## 2024-04-17 PROCEDURE — 85025 COMPLETE CBC W/AUTO DIFF WBC: CPT

## 2024-04-17 PROCEDURE — 99285 EMERGENCY DEPT VISIT HI MDM: CPT

## 2024-04-17 PROCEDURE — 87040 BLOOD CULTURE FOR BACTERIA: CPT

## 2024-04-17 PROCEDURE — 6360000002 HC RX W HCPCS: Performed by: EMERGENCY MEDICINE

## 2024-04-17 PROCEDURE — 2060000000 HC ICU INTERMEDIATE R&B

## 2024-04-17 PROCEDURE — 83605 ASSAY OF LACTIC ACID: CPT

## 2024-04-17 PROCEDURE — 2580000003 HC RX 258: Performed by: EMERGENCY MEDICINE

## 2024-04-17 PROCEDURE — 86140 C-REACTIVE PROTEIN: CPT

## 2024-04-17 PROCEDURE — 93005 ELECTROCARDIOGRAM TRACING: CPT | Performed by: EMERGENCY MEDICINE

## 2024-04-17 PROCEDURE — 96374 THER/PROPH/DIAG INJ IV PUSH: CPT

## 2024-04-17 RX ORDER — 0.9 % SODIUM CHLORIDE 0.9 %
1448 INTRAVENOUS SOLUTION INTRAVENOUS ONCE
Status: COMPLETED | OUTPATIENT
Start: 2024-04-17 | End: 2024-04-18

## 2024-04-17 RX ORDER — 0.9 % SODIUM CHLORIDE 0.9 %
1000 INTRAVENOUS SOLUTION INTRAVENOUS ONCE
Status: COMPLETED | OUTPATIENT
Start: 2024-04-17 | End: 2024-04-17

## 2024-04-17 RX ADMIN — WATER 2000 MG: 1 INJECTION INTRAMUSCULAR; INTRAVENOUS; SUBCUTANEOUS at 21:45

## 2024-04-17 RX ADMIN — SODIUM CHLORIDE 1448 ML: 9 INJECTION, SOLUTION INTRAVENOUS at 22:31

## 2024-04-17 RX ADMIN — SODIUM CHLORIDE 1000 ML: 9 INJECTION, SOLUTION INTRAVENOUS at 21:44

## 2024-04-17 ASSESSMENT — PAIN SCALES - GENERAL: PAINLEVEL_OUTOF10: 5

## 2024-04-17 ASSESSMENT — PAIN DESCRIPTION - DESCRIPTORS: DESCRIPTORS: ACHING

## 2024-04-17 ASSESSMENT — PAIN - FUNCTIONAL ASSESSMENT
PAIN_FUNCTIONAL_ASSESSMENT: PREVENTS OR INTERFERES SOME ACTIVE ACTIVITIES AND ADLS
PAIN_FUNCTIONAL_ASSESSMENT: 0-10

## 2024-04-17 ASSESSMENT — PAIN DESCRIPTION - LOCATION: LOCATION: ELBOW

## 2024-04-17 ASSESSMENT — PAIN DESCRIPTION - ORIENTATION: ORIENTATION: RIGHT

## 2024-04-18 ENCOUNTER — APPOINTMENT (OUTPATIENT)
Facility: HOSPITAL | Age: 64
DRG: 872 | End: 2024-04-18
Payer: MEDICARE

## 2024-04-18 PROBLEM — M71.121 SEPTIC OLECRANON BURSITIS OF RIGHT ELBOW: Status: ACTIVE | Noted: 2024-04-18

## 2024-04-18 LAB
ALBUMIN SERPL-MCNC: 3.1 G/DL (ref 3.5–5)
ALBUMIN/GLOB SERPL: 0.9 (ref 1.1–2.2)
ALP SERPL-CCNC: 57 U/L (ref 45–117)
ALT SERPL-CCNC: 20 U/L (ref 12–78)
AMPHET UR QL SCN: NEGATIVE
ANION GAP SERPL CALC-SCNC: 7 MMOL/L (ref 5–15)
APPEARANCE FLD: ABNORMAL
APPEARANCE UR: CLEAR
AST SERPL-CCNC: 20 U/L (ref 15–37)
BACTERIA URNS QL MICRO: NEGATIVE /HPF
BARBITURATES UR QL SCN: NEGATIVE
BASOPHILS # BLD: 0 K/UL (ref 0–0.1)
BASOPHILS NFR BLD: 0 % (ref 0–1)
BENZODIAZ UR QL: NEGATIVE
BILIRUB SERPL-MCNC: 0.4 MG/DL (ref 0.2–1)
BILIRUB UR QL: NEGATIVE
BUN SERPL-MCNC: 13 MG/DL (ref 6–20)
BUN/CREAT SERPL: 16 (ref 12–20)
CALCIUM SERPL-MCNC: 9 MG/DL (ref 8.5–10.1)
CANNABINOIDS UR QL SCN: NEGATIVE
CHLORIDE SERPL-SCNC: 111 MMOL/L (ref 97–108)
CO2 SERPL-SCNC: 20 MMOL/L (ref 21–32)
COCAINE UR QL SCN: NEGATIVE
COLOR FLD: YELLOW
COLOR UR: NORMAL
CREAT SERPL-MCNC: 0.79 MG/DL (ref 0.7–1.3)
DIFFERENTIAL METHOD BLD: ABNORMAL
EKG ATRIAL RATE: 113 BPM
EKG DIAGNOSIS: NORMAL
EKG P AXIS: 49 DEGREES
EKG P-R INTERVAL: 158 MS
EKG Q-T INTERVAL: 334 MS
EKG QRS DURATION: 94 MS
EKG QTC CALCULATION (BAZETT): 458 MS
EKG R AXIS: -88 DEGREES
EKG T AXIS: 46 DEGREES
EKG VENTRICULAR RATE: 113 BPM
EOSINOPHIL # BLD: 0.1 K/UL (ref 0–0.4)
EOSINOPHIL NFR BLD: 0 % (ref 0–7)
EPITH CASTS URNS QL MICRO: NORMAL /LPF
ERYTHROCYTE [DISTWIDTH] IN BLOOD BY AUTOMATED COUNT: 14.2 % (ref 11.5–14.5)
EST. AVERAGE GLUCOSE BLD GHB EST-MCNC: 105 MG/DL
GLOBULIN SER CALC-MCNC: 3.4 G/DL (ref 2–4)
GLUCOSE BLD STRIP.AUTO-MCNC: 126 MG/DL (ref 65–117)
GLUCOSE BLD STRIP.AUTO-MCNC: 135 MG/DL (ref 65–117)
GLUCOSE BLD STRIP.AUTO-MCNC: 137 MG/DL (ref 65–117)
GLUCOSE BLD STRIP.AUTO-MCNC: 138 MG/DL (ref 65–117)
GLUCOSE SERPL-MCNC: 125 MG/DL (ref 65–100)
GLUCOSE UR STRIP.AUTO-MCNC: NEGATIVE MG/DL
HBA1C MFR BLD: 5.3 % (ref 4–5.6)
HCT VFR BLD AUTO: 40.6 % (ref 36.6–50.3)
HGB BLD-MCNC: 13.8 G/DL (ref 12.1–17)
HGB UR QL STRIP: NEGATIVE
HYALINE CASTS URNS QL MICRO: NORMAL /LPF (ref 0–5)
IMM GRANULOCYTES # BLD AUTO: 0.1 K/UL (ref 0–0.04)
IMM GRANULOCYTES NFR BLD AUTO: 1 % (ref 0–0.5)
KETONES UR QL STRIP.AUTO: NEGATIVE MG/DL
LACTATE BLD-SCNC: 0.78 MMOL/L (ref 0.4–2)
LACTATE SERPL-SCNC: 0.6 MMOL/L (ref 0.4–2)
LACTATE SERPL-SCNC: 1.3 MMOL/L (ref 0.4–2)
LEUKOCYTE ESTERASE UR QL STRIP.AUTO: NEGATIVE
LYMPHOCYTES # BLD: 0.9 K/UL (ref 0.8–3.5)
LYMPHOCYTES NFR BLD: 6 % (ref 12–49)
LYMPHOCYTES NFR FLD: 5 %
Lab: NORMAL
MAGNESIUM SERPL-MCNC: 1.6 MG/DL (ref 1.6–2.4)
MCH RBC QN AUTO: 32.5 PG (ref 26–34)
MCHC RBC AUTO-ENTMCNC: 34 G/DL (ref 30–36.5)
MCV RBC AUTO: 95.8 FL (ref 80–99)
METHADONE UR QL: NEGATIVE
MONOCYTES # BLD: 1.4 K/UL (ref 0–1)
MONOCYTES NFR BLD: 9 % (ref 5–13)
MONOS+MACROS NFR FLD: 2 %
NEUTROPHILS NFR FLD: 93 %
NEUTS SEG # BLD: 13.7 K/UL (ref 1.8–8)
NEUTS SEG NFR BLD: 84 % (ref 32–75)
NITRITE UR QL STRIP.AUTO: NEGATIVE
NRBC # BLD: 0 K/UL (ref 0–0.01)
NRBC BLD-RTO: 0 PER 100 WBC
NUC CELL # FLD: ABNORMAL /CU MM
OPIATES UR QL: NEGATIVE
PCP UR QL: NEGATIVE
PH UR STRIP: 7 (ref 5–8)
PHOSPHATE SERPL-MCNC: 2.3 MG/DL (ref 2.6–4.7)
PLATELET # BLD AUTO: 230 K/UL (ref 150–400)
PMV BLD AUTO: 10.5 FL (ref 8.9–12.9)
POTASSIUM SERPL-SCNC: 3.6 MMOL/L (ref 3.5–5.1)
PROT SERPL-MCNC: 6.5 G/DL (ref 6.4–8.2)
PROT UR STRIP-MCNC: NEGATIVE MG/DL
RBC # BLD AUTO: 4.24 M/UL (ref 4.1–5.7)
RBC # FLD: >100 /CU MM
RBC #/AREA URNS HPF: NORMAL /HPF (ref 0–5)
SERVICE CMNT-IMP: ABNORMAL
SODIUM SERPL-SCNC: 138 MMOL/L (ref 136–145)
SP GR UR REFRACTOMETRY: 1.01 (ref 1–1.03)
SPECIMEN SOURCE FLD: ABNORMAL
TSH SERPL DL<=0.05 MIU/L-ACNC: 1.51 UIU/ML (ref 0.36–3.74)
URINE CULTURE IF INDICATED: NORMAL
UROBILINOGEN UR QL STRIP.AUTO: 1 EU/DL (ref 0.2–1)
WBC # BLD AUTO: 16.2 K/UL (ref 4.1–11.1)
WBC URNS QL MICRO: NORMAL /HPF (ref 0–4)

## 2024-04-18 PROCEDURE — 80053 COMPREHEN METABOLIC PANEL: CPT

## 2024-04-18 PROCEDURE — 6370000000 HC RX 637 (ALT 250 FOR IP): Performed by: INTERNAL MEDICINE

## 2024-04-18 PROCEDURE — 84443 ASSAY THYROID STIM HORMONE: CPT

## 2024-04-18 PROCEDURE — 20605 DRAIN/INJ JOINT/BURSA W/O US: CPT | Performed by: PHYSICIAN ASSISTANT

## 2024-04-18 PROCEDURE — 2500000003 HC RX 250 WO HCPCS: Performed by: PHYSICIAN ASSISTANT

## 2024-04-18 PROCEDURE — 97530 THERAPEUTIC ACTIVITIES: CPT

## 2024-04-18 PROCEDURE — 87186 SC STD MICRODIL/AGAR DIL: CPT

## 2024-04-18 PROCEDURE — 2580000003 HC RX 258: Performed by: INTERNAL MEDICINE

## 2024-04-18 PROCEDURE — 87205 SMEAR GRAM STAIN: CPT

## 2024-04-18 PROCEDURE — 81001 URINALYSIS AUTO W/SCOPE: CPT

## 2024-04-18 PROCEDURE — 87077 CULTURE AEROBIC IDENTIFY: CPT

## 2024-04-18 PROCEDURE — 6360000002 HC RX W HCPCS: Performed by: INTERNAL MEDICINE

## 2024-04-18 PROCEDURE — 82962 GLUCOSE BLOOD TEST: CPT

## 2024-04-18 PROCEDURE — 80307 DRUG TEST PRSMV CHEM ANLYZR: CPT

## 2024-04-18 PROCEDURE — 36415 COLL VENOUS BLD VENIPUNCTURE: CPT

## 2024-04-18 PROCEDURE — 87147 CULTURE TYPE IMMUNOLOGIC: CPT

## 2024-04-18 PROCEDURE — 71045 X-RAY EXAM CHEST 1 VIEW: CPT

## 2024-04-18 PROCEDURE — 85025 COMPLETE CBC W/AUTO DIFF WBC: CPT

## 2024-04-18 PROCEDURE — 97161 PT EVAL LOW COMPLEX 20 MIN: CPT

## 2024-04-18 PROCEDURE — 6370000000 HC RX 637 (ALT 250 FOR IP): Performed by: HOSPITALIST

## 2024-04-18 PROCEDURE — 2060000000 HC ICU INTERMEDIATE R&B

## 2024-04-18 PROCEDURE — 89050 BODY FLUID CELL COUNT: CPT

## 2024-04-18 PROCEDURE — 83036 HEMOGLOBIN GLYCOSYLATED A1C: CPT

## 2024-04-18 PROCEDURE — 84100 ASSAY OF PHOSPHORUS: CPT

## 2024-04-18 PROCEDURE — 83735 ASSAY OF MAGNESIUM: CPT

## 2024-04-18 PROCEDURE — 87070 CULTURE OTHR SPECIMN AEROBIC: CPT

## 2024-04-18 PROCEDURE — 83605 ASSAY OF LACTIC ACID: CPT

## 2024-04-18 PROCEDURE — 0M933ZX DRAINAGE OF RIGHT ELBOW BURSA AND LIGAMENT, PERCUTANEOUS APPROACH, DIAGNOSTIC: ICD-10-PCS | Performed by: ORTHOPAEDIC SURGERY

## 2024-04-18 RX ORDER — ACETAMINOPHEN 325 MG/1
650 TABLET ORAL EVERY 6 HOURS PRN
Status: DISCONTINUED | OUTPATIENT
Start: 2024-04-18 | End: 2024-04-21 | Stop reason: HOSPADM

## 2024-04-18 RX ORDER — CARVEDILOL 12.5 MG/1
12.5 TABLET ORAL 2 TIMES DAILY
Status: DISCONTINUED | OUTPATIENT
Start: 2024-04-18 | End: 2024-04-21 | Stop reason: HOSPADM

## 2024-04-18 RX ORDER — NICOTINE 21 MG/24HR
1 PATCH, TRANSDERMAL 24 HOURS TRANSDERMAL DAILY
Status: DISCONTINUED | OUTPATIENT
Start: 2024-04-18 | End: 2024-04-21 | Stop reason: HOSPADM

## 2024-04-18 RX ORDER — ONDANSETRON 2 MG/ML
4 INJECTION INTRAMUSCULAR; INTRAVENOUS EVERY 6 HOURS PRN
Status: DISCONTINUED | OUTPATIENT
Start: 2024-04-18 | End: 2024-04-21 | Stop reason: HOSPADM

## 2024-04-18 RX ORDER — SODIUM CHLORIDE, SODIUM LACTATE, POTASSIUM CHLORIDE, CALCIUM CHLORIDE 600; 310; 30; 20 MG/100ML; MG/100ML; MG/100ML; MG/100ML
INJECTION, SOLUTION INTRAVENOUS CONTINUOUS
Status: DISCONTINUED | OUTPATIENT
Start: 2024-04-18 | End: 2024-04-21

## 2024-04-18 RX ORDER — ENOXAPARIN SODIUM 100 MG/ML
40 INJECTION SUBCUTANEOUS DAILY
Status: DISCONTINUED | OUTPATIENT
Start: 2024-04-18 | End: 2024-04-21 | Stop reason: HOSPADM

## 2024-04-18 RX ORDER — INSULIN LISPRO 100 [IU]/ML
0-8 INJECTION, SOLUTION INTRAVENOUS; SUBCUTANEOUS
Status: DISCONTINUED | OUTPATIENT
Start: 2024-04-18 | End: 2024-04-21 | Stop reason: HOSPADM

## 2024-04-18 RX ORDER — FENOFIBRATE 160 MG/1
160 TABLET ORAL DAILY
Status: DISCONTINUED | OUTPATIENT
Start: 2024-04-18 | End: 2024-04-21 | Stop reason: HOSPADM

## 2024-04-18 RX ORDER — OMEGA-3-ACID ETHYL ESTERS 1 G/1
2 CAPSULE, LIQUID FILLED ORAL 2 TIMES DAILY
Status: DISCONTINUED | OUTPATIENT
Start: 2024-04-18 | End: 2024-04-21 | Stop reason: HOSPADM

## 2024-04-18 RX ORDER — INSULIN LISPRO 100 [IU]/ML
0-4 INJECTION, SOLUTION INTRAVENOUS; SUBCUTANEOUS NIGHTLY
Status: DISCONTINUED | OUTPATIENT
Start: 2024-04-18 | End: 2024-04-21 | Stop reason: HOSPADM

## 2024-04-18 RX ORDER — ONDANSETRON 4 MG/1
4 TABLET, ORALLY DISINTEGRATING ORAL EVERY 8 HOURS PRN
Status: DISCONTINUED | OUTPATIENT
Start: 2024-04-18 | End: 2024-04-21 | Stop reason: HOSPADM

## 2024-04-18 RX ORDER — ASPIRIN 81 MG/1
81 TABLET, CHEWABLE ORAL DAILY
Status: DISCONTINUED | OUTPATIENT
Start: 2024-04-18 | End: 2024-04-21 | Stop reason: HOSPADM

## 2024-04-18 RX ORDER — SODIUM CHLORIDE 9 MG/ML
INJECTION, SOLUTION INTRAVENOUS PRN
Status: DISCONTINUED | OUTPATIENT
Start: 2024-04-18 | End: 2024-04-21 | Stop reason: HOSPADM

## 2024-04-18 RX ORDER — MORPHINE SULFATE 2 MG/ML
2 INJECTION, SOLUTION INTRAMUSCULAR; INTRAVENOUS EVERY 4 HOURS PRN
Status: DISCONTINUED | OUTPATIENT
Start: 2024-04-18 | End: 2024-04-21 | Stop reason: HOSPADM

## 2024-04-18 RX ORDER — AMITRIPTYLINE HYDROCHLORIDE 50 MG/1
150 TABLET, FILM COATED ORAL NIGHTLY
Status: DISCONTINUED | OUTPATIENT
Start: 2024-04-18 | End: 2024-04-21 | Stop reason: HOSPADM

## 2024-04-18 RX ORDER — SODIUM CHLORIDE 0.9 % (FLUSH) 0.9 %
5-40 SYRINGE (ML) INJECTION EVERY 12 HOURS SCHEDULED
Status: DISCONTINUED | OUTPATIENT
Start: 2024-04-18 | End: 2024-04-21 | Stop reason: HOSPADM

## 2024-04-18 RX ORDER — OXYCODONE HYDROCHLORIDE AND ACETAMINOPHEN 5; 325 MG/1; MG/1
1 TABLET ORAL EVERY 4 HOURS PRN
Status: DISCONTINUED | OUTPATIENT
Start: 2024-04-18 | End: 2024-04-21 | Stop reason: HOSPADM

## 2024-04-18 RX ORDER — LISINOPRIL 10 MG/1
10 TABLET ORAL DAILY
Status: DISCONTINUED | OUTPATIENT
Start: 2024-04-18 | End: 2024-04-21 | Stop reason: HOSPADM

## 2024-04-18 RX ORDER — ACETAMINOPHEN 650 MG/1
650 SUPPOSITORY RECTAL EVERY 6 HOURS PRN
Status: DISCONTINUED | OUTPATIENT
Start: 2024-04-18 | End: 2024-04-21 | Stop reason: HOSPADM

## 2024-04-18 RX ORDER — LIDOCAINE HYDROCHLORIDE AND EPINEPHRINE 10; 10 MG/ML; UG/ML
10 INJECTION, SOLUTION INFILTRATION; PERINEURAL ONCE
Status: COMPLETED | OUTPATIENT
Start: 2024-04-18 | End: 2024-04-18

## 2024-04-18 RX ORDER — DEXTROSE MONOHYDRATE 100 MG/ML
INJECTION, SOLUTION INTRAVENOUS CONTINUOUS PRN
Status: DISCONTINUED | OUTPATIENT
Start: 2024-04-18 | End: 2024-04-21 | Stop reason: HOSPADM

## 2024-04-18 RX ORDER — SODIUM CHLORIDE 0.9 % (FLUSH) 0.9 %
5-40 SYRINGE (ML) INJECTION PRN
Status: DISCONTINUED | OUTPATIENT
Start: 2024-04-18 | End: 2024-04-21 | Stop reason: HOSPADM

## 2024-04-18 RX ORDER — ATORVASTATIN CALCIUM 20 MG/1
10 TABLET, FILM COATED ORAL DAILY
Status: DISCONTINUED | OUTPATIENT
Start: 2024-04-18 | End: 2024-04-21 | Stop reason: HOSPADM

## 2024-04-18 RX ADMIN — CARVEDILOL 12.5 MG: 3.12 TABLET, FILM COATED ORAL at 09:27

## 2024-04-18 RX ADMIN — LIDOCAINE HYDROCHLORIDE,EPINEPHRINE BITARTRATE 10 ML: 10; .01 INJECTION, SOLUTION INFILTRATION; PERINEURAL at 11:00

## 2024-04-18 RX ADMIN — SODIUM CHLORIDE, POTASSIUM CHLORIDE, SODIUM LACTATE AND CALCIUM CHLORIDE: 600; 310; 30; 20 INJECTION, SOLUTION INTRAVENOUS at 13:10

## 2024-04-18 RX ADMIN — FENOFIBRATE 160 MG: 160 TABLET ORAL at 09:27

## 2024-04-18 RX ADMIN — ONDANSETRON 4 MG: 4 TABLET, ORALLY DISINTEGRATING ORAL at 10:51

## 2024-04-18 RX ADMIN — OXYCODONE AND ACETAMINOPHEN 1 TABLET: 5; 325 TABLET ORAL at 01:01

## 2024-04-18 RX ADMIN — PIPERACILLIN AND TAZOBACTAM 3375 MG: 3; .375 INJECTION, POWDER, LYOPHILIZED, FOR SOLUTION INTRAVENOUS at 22:12

## 2024-04-18 RX ADMIN — SODIUM CHLORIDE: 9 INJECTION, SOLUTION INTRAVENOUS at 04:28

## 2024-04-18 RX ADMIN — VANCOMYCIN HYDROCHLORIDE 1000 MG: 1 INJECTION, POWDER, LYOPHILIZED, FOR SOLUTION INTRAVENOUS at 17:28

## 2024-04-18 RX ADMIN — PIPERACILLIN AND TAZOBACTAM 3375 MG: 3; .375 INJECTION, POWDER, LYOPHILIZED, FOR SOLUTION INTRAVENOUS at 05:20

## 2024-04-18 RX ADMIN — VANCOMYCIN HYDROCHLORIDE 2000 MG: 10 INJECTION, POWDER, LYOPHILIZED, FOR SOLUTION INTRAVENOUS at 04:30

## 2024-04-18 RX ADMIN — SODIUM CHLORIDE, POTASSIUM CHLORIDE, SODIUM LACTATE AND CALCIUM CHLORIDE: 600; 310; 30; 20 INJECTION, SOLUTION INTRAVENOUS at 04:16

## 2024-04-18 RX ADMIN — AMITRIPTYLINE HYDROCHLORIDE 150 MG: 50 TABLET, FILM COATED ORAL at 22:06

## 2024-04-18 RX ADMIN — ATORVASTATIN CALCIUM 10 MG: 10 TABLET, FILM COATED ORAL at 09:27

## 2024-04-18 RX ADMIN — SODIUM CHLORIDE, POTASSIUM CHLORIDE, SODIUM LACTATE AND CALCIUM CHLORIDE: 600; 310; 30; 20 INJECTION, SOLUTION INTRAVENOUS at 20:01

## 2024-04-18 RX ADMIN — LISINOPRIL 10 MG: 10 TABLET ORAL at 09:27

## 2024-04-18 RX ADMIN — ENOXAPARIN SODIUM 40 MG: 100 INJECTION SUBCUTANEOUS at 09:28

## 2024-04-18 RX ADMIN — PIPERACILLIN AND TAZOBACTAM 3375 MG: 3; .375 INJECTION, POWDER, LYOPHILIZED, FOR SOLUTION INTRAVENOUS at 13:07

## 2024-04-18 RX ADMIN — OMEGA-3-ACID ETHYL ESTERS 2 CAPSULE: 1 CAPSULE, LIQUID FILLED ORAL at 22:06

## 2024-04-18 RX ADMIN — ASPIRIN 81 MG CHEWABLE TABLET 81 MG: 81 TABLET CHEWABLE at 09:27

## 2024-04-18 RX ADMIN — CARVEDILOL 12.5 MG: 3.12 TABLET, FILM COATED ORAL at 22:06

## 2024-04-18 RX ADMIN — OMEGA-3-ACID ETHYL ESTERS 2 CAPSULE: 1 CAPSULE, LIQUID FILLED ORAL at 10:51

## 2024-04-18 RX ADMIN — SODIUM CHLORIDE, PRESERVATIVE FREE 10 ML: 5 INJECTION INTRAVENOUS at 22:06

## 2024-04-18 ASSESSMENT — PAIN DESCRIPTION - LOCATION
LOCATION: ELBOW
LOCATION: ELBOW

## 2024-04-18 ASSESSMENT — PAIN DESCRIPTION - DESCRIPTORS: DESCRIPTORS: ACHING

## 2024-04-18 ASSESSMENT — PAIN SCALES - GENERAL
PAINLEVEL_OUTOF10: 0
PAINLEVEL_OUTOF10: 5

## 2024-04-18 ASSESSMENT — PAIN DESCRIPTION - ORIENTATION: ORIENTATION: RIGHT

## 2024-04-18 NOTE — ED TRIAGE NOTES
Patient arrives to ED from [facility] for possible bursitis and needing IV antibiotics.  Patient reports falling on Sunday hitting his elbow, redness and pain following after.

## 2024-04-18 NOTE — ED PROVIDER NOTES
[facility] EMERGENCY DEP  EMERGENCY DEPARTMENT ENCOUNTER      Pt Name: Josef Mercado  MRN: [id number]  Birthdate 1960  Date of evaluation: 4/17/2024  Provider: Jose Bailey MD      HISTORY OF PRESENT ILLNESS      HPI  63-year-old with a past medical history of coronary artery disease, spinocerebellar ataxia, and sleep apnea presenting due to septic arthritis of the right elbow.  Patient has a history of recurrent falls and fell on Sunday.  He hit his elbow and has been experiencing pain.  He did notice that it was red but followed up with orthopedics today and he was diagnosed with septic bursitis of the right elbow and was told to come to the emergency department for admission and IV antibiotics.  He denies fevers or chills.  Denies a history of MRSA.  No recent hospitalizations.  He lives at home with his wife.      Nursing Notes were reviewed.    REVIEW OF SYSTEMS         Review of Systems  A complete review of systems was performed and all systems reviewed were negative unless otherwise documented in the HPI.       PAST MEDICAL HISTORY     Past Medical History:   Diagnosis Date    Arthritis     thumbs, bilateral    CAD (coronary artery disease) 04/14/2017    Diabetes (HCC)     GERD (gastroesophageal reflux disease)     occasionally    Myocardial infarction (HCC) 04/14/2017    Sleep apnea          SURGICAL HISTORY       Past Surgical History:   Procedure Laterality Date    CARDIAC CATHETERIZATION  04/14/2017    2 stent    CORONARY ANGIOPLASTY WITH STENT PLACEMENT      HEENT      wisdom teeth removed    PTCA      VASECTOMY           CURRENT MEDICATIONS       Previous Medications    AMITRIPTYLINE (ELAVIL) 75 MG TABLET    Take 150 mg by mouth    ASPIRIN 81 MG CHEWABLE TABLET    Take 81 mg by mouth daily    ATORVASTATIN (LIPITOR) 10 MG TABLET    Take by mouth daily    CARVEDILOL (COREG) 12.5 MG TABLET    Take 12.5 mg by mouth 2 times daily    FENOFIBRATE MICRONIZED (LOFIBRA) 200 MG CAPSULE    TK 1 C PO QD [facility]

## 2024-04-18 NOTE — ED NOTES
8:45 PM  Josef Mercado is a 63 y.o. male presenting to the ED referred by [facility] for septic olecranon bursitis of the right elbow and encouraged pt to come to ED for IV antibiotics. Pt reports having a fall on Sunday morning landing on right elbow. X-rays were done at [facility] that did not show any acute fractures. + erythema on right elbow w/ streaking to the forearm.     Denies fevers.     Medical hx: Dm, cardiac stents, HLD    I have evaluated the patient as the Provider in Rapid Medical Evaluation (RME). I have reviewed his vital signs and the triage nurse assessment. I have talked with the patient and any available family and advised that I am the provider in triage and have ordered the appropriate study to initiate their work up based on the clinical presentation during my assessment. I have advised that the patient will be accommodated in the Main ED as soon as possible. I have also requested to contact the triage nurse or myself immediately if the patient experiences any changes in their condition during this brief waiting period.  JAMA Batista NP, Jennie Z, APRN - NP  04/17/24 2049

## 2024-04-18 NOTE — ED NOTES
TRANSFER - IN REPORT:    Verbal report received from Althea RN on Josef Mercado  being received from Allison RN for routine progression of patient care      Report consisted of patient's Situation, Background, Assessment and   Recommendations(SBAR).     Information from the following report(s) Nurse Handoff Report, ED Encounter Summary, ED SBAR, Adult Overview, MAR, and Recent Results was reviewed with the receiving nurse.    Opportunity for questions and clarification was provided.      Assessment completed upon patient's arrival to unit and care assumed.   sona

## 2024-04-18 NOTE — PROCEDURES
Olecranon Bursa Aspiration Procedural Report    Indications: Right elbow olecranon bursa effusion    Preprocedural Diagnosis: Right elbow septic olecranon bursitis    Postprocedural Diagnosis: Right elbow septic olecranon bursitis    Provider: RACHEL Gonzalez     Anesthesia: Local    Allergy:   Allergies   Allergen Reactions    Fentanyl Anxiety     Patient developed severe agitation and anxiety after administration during a cardiac cath.       Procedure Details:  The risks,benefits and alternatives of a bursa aspiration were explained and consent was obtained for the procedure. The aspiration is being done for diagnostic purposes.  The area was cleansed using Betadine.  Anesthetic using 1% lidocaine with epinephrine was infiltrated locally.  Using a 18 gauge needle 2 mLs of cloudy fluid was obtained.  A dressing and ACE wrap was applied.  The patient tolerated the procedure well.    The fluid was sent to the lab.    Orders on Fluid:  bursal fluid for cell count; C&S and gram stain    Estimated Blood Loss: none    Specimens: Right olecranon bursa fluid         Complications:  None; patient tolerated the procedure well.             Signed By: RACHEL Gonzalez

## 2024-04-18 NOTE — H&P
to continue to follow-up with his providers upon discharge from the hospital for continuation of care.        DIET: ADULT DIET; Regular; 3 carb choices (45 gm/meal)   ISOLATION PRECAUTIONS: No active isolations  CODE STATUS: Full Code   Central Line:   None  DVT PROPHYLAXIS: Lovenox  FUNCTIONAL STATUS PRIOR TO HOSPITALIZATION: Ambulatory and capable of self-care but relies on assistive devices (rolling walker/cane).   Ambulatory status/function: Ambulates with assistance:  Cane   EARLY MOBILITY ASSESSMENT: Recommend an assessment from physical therapy and/or occupational therapy  ANTICIPATED DISCHARGE: Greater than 48 hours.  ANTICIPATED DISPOSITION: Home  EMERGENCY CONTACT/SURROGATE DECISION MAKER: Citlalli Hollis, spouse    Critical care was performed for this encounter.    CRITICAL CARE ATTESTATION:   I had a face to face encounter with the patient, reviewed and interpreted patient data including clinical events, labs, images, vital signs, I/O's, and examined patient.  I have discussed the case and the plan and management of the patient's care with the consulting services, the bedside nurses and necessary ancillary providers.         NOTE OF PERSONAL INVOLVEMENT IN CARE   This patient has a high probability of imminent, clinically significant deterioration, which requires the highest level of preparedness to intervene urgently. I participated in the decision-making and personally managed or directed the management of the following life and organ supporting interventions that required my frequent assessment to treat or prevent imminent deterioration.       I personally spent 45 minutes of critical care time.  This is time spent at this critically ill patient's bedside actively involved in patient care as well as the coordination of care and discussions with the patient's family.  This does not include any procedural time which has been billed separately.    Signed By: Nida Keys MD     April 18, 2024

## 2024-04-19 LAB
ANION GAP SERPL CALC-SCNC: 4 MMOL/L (ref 5–15)
BACTERIA SPEC CULT: NORMAL
BACTERIA SPEC CULT: NORMAL
BUN SERPL-MCNC: 10 MG/DL (ref 6–20)
BUN/CREAT SERPL: 12 (ref 12–20)
CALCIUM SERPL-MCNC: 9.2 MG/DL (ref 8.5–10.1)
CHLORIDE SERPL-SCNC: 109 MMOL/L (ref 97–108)
CO2 SERPL-SCNC: 23 MMOL/L (ref 21–32)
CREAT SERPL-MCNC: 0.81 MG/DL (ref 0.7–1.3)
DATE LAST DOSE: NORMAL
DOSE AMOUNT: NORMAL UNITS
DOSE DATE/TIME: NORMAL
GLUCOSE BLD STRIP.AUTO-MCNC: 232 MG/DL (ref 65–117)
GLUCOSE BLD STRIP.AUTO-MCNC: 82 MG/DL (ref 65–117)
GLUCOSE BLD STRIP.AUTO-MCNC: 97 MG/DL (ref 65–117)
GLUCOSE BLD STRIP.AUTO-MCNC: 97 MG/DL (ref 65–117)
GLUCOSE SERPL-MCNC: 93 MG/DL (ref 65–100)
POTASSIUM SERPL-SCNC: 3.3 MMOL/L (ref 3.5–5.1)
SERVICE CMNT-IMP: ABNORMAL
SERVICE CMNT-IMP: NORMAL
SODIUM SERPL-SCNC: 136 MMOL/L (ref 136–145)
VANCOMYCIN TROUGH SERPL-MCNC: 7 UG/ML (ref 5–10)

## 2024-04-19 PROCEDURE — 97530 THERAPEUTIC ACTIVITIES: CPT

## 2024-04-19 PROCEDURE — 6360000002 HC RX W HCPCS: Performed by: INTERNAL MEDICINE

## 2024-04-19 PROCEDURE — 80048 BASIC METABOLIC PNL TOTAL CA: CPT

## 2024-04-19 PROCEDURE — 6370000000 HC RX 637 (ALT 250 FOR IP): Performed by: HOSPITALIST

## 2024-04-19 PROCEDURE — 6370000000 HC RX 637 (ALT 250 FOR IP): Performed by: INTERNAL MEDICINE

## 2024-04-19 PROCEDURE — 82962 GLUCOSE BLOOD TEST: CPT

## 2024-04-19 PROCEDURE — 6360000002 HC RX W HCPCS: Performed by: HOSPITALIST

## 2024-04-19 PROCEDURE — 2580000003 HC RX 258: Performed by: INTERNAL MEDICINE

## 2024-04-19 PROCEDURE — 80202 ASSAY OF VANCOMYCIN: CPT

## 2024-04-19 PROCEDURE — 2580000003 HC RX 258: Performed by: HOSPITALIST

## 2024-04-19 PROCEDURE — 97535 SELF CARE MNGMENT TRAINING: CPT

## 2024-04-19 PROCEDURE — 36415 COLL VENOUS BLD VENIPUNCTURE: CPT

## 2024-04-19 PROCEDURE — APPNB15 APP NON BILLABLE TIME 0-15 MINS: Performed by: PHYSICIAN ASSISTANT

## 2024-04-19 PROCEDURE — 97165 OT EVAL LOW COMPLEX 30 MIN: CPT

## 2024-04-19 PROCEDURE — 1100000000 HC RM PRIVATE

## 2024-04-19 RX ADMIN — SODIUM CHLORIDE, PRESERVATIVE FREE 10 ML: 5 INJECTION INTRAVENOUS at 09:36

## 2024-04-19 RX ADMIN — PIPERACILLIN AND TAZOBACTAM 3375 MG: 3; .375 INJECTION, POWDER, LYOPHILIZED, FOR SOLUTION INTRAVENOUS at 06:50

## 2024-04-19 RX ADMIN — VANCOMYCIN HYDROCHLORIDE 1000 MG: 1 INJECTION, POWDER, LYOPHILIZED, FOR SOLUTION INTRAVENOUS at 05:38

## 2024-04-19 RX ADMIN — ASPIRIN 81 MG CHEWABLE TABLET 81 MG: 81 TABLET CHEWABLE at 09:35

## 2024-04-19 RX ADMIN — AMITRIPTYLINE HYDROCHLORIDE 150 MG: 50 TABLET, FILM COATED ORAL at 21:22

## 2024-04-19 RX ADMIN — LISINOPRIL 10 MG: 10 TABLET ORAL at 09:35

## 2024-04-19 RX ADMIN — OMEGA-3-ACID ETHYL ESTERS 2 CAPSULE: 1 CAPSULE, LIQUID FILLED ORAL at 21:22

## 2024-04-19 RX ADMIN — SODIUM CHLORIDE, POTASSIUM CHLORIDE, SODIUM LACTATE AND CALCIUM CHLORIDE: 600; 310; 30; 20 INJECTION, SOLUTION INTRAVENOUS at 09:43

## 2024-04-19 RX ADMIN — SODIUM CHLORIDE 1250 MG: 9 INJECTION, SOLUTION INTRAVENOUS at 16:05

## 2024-04-19 RX ADMIN — CARVEDILOL 12.5 MG: 3.12 TABLET, FILM COATED ORAL at 09:35

## 2024-04-19 RX ADMIN — SODIUM CHLORIDE, POTASSIUM CHLORIDE, SODIUM LACTATE AND CALCIUM CHLORIDE: 600; 310; 30; 20 INJECTION, SOLUTION INTRAVENOUS at 21:32

## 2024-04-19 RX ADMIN — SODIUM CHLORIDE, POTASSIUM CHLORIDE, SODIUM LACTATE AND CALCIUM CHLORIDE: 600; 310; 30; 20 INJECTION, SOLUTION INTRAVENOUS at 02:52

## 2024-04-19 RX ADMIN — SODIUM CHLORIDE: 9 INJECTION, SOLUTION INTRAVENOUS at 16:04

## 2024-04-19 RX ADMIN — PIPERACILLIN AND TAZOBACTAM 3375 MG: 3; .375 INJECTION, POWDER, LYOPHILIZED, FOR SOLUTION INTRAVENOUS at 13:42

## 2024-04-19 RX ADMIN — PIPERACILLIN AND TAZOBACTAM 3375 MG: 3; .375 INJECTION, POWDER, LYOPHILIZED, FOR SOLUTION INTRAVENOUS at 21:33

## 2024-04-19 RX ADMIN — ATORVASTATIN CALCIUM 10 MG: 10 TABLET, FILM COATED ORAL at 09:35

## 2024-04-19 RX ADMIN — FENOFIBRATE 160 MG: 160 TABLET ORAL at 09:35

## 2024-04-19 RX ADMIN — OMEGA-3-ACID ETHYL ESTERS 2 CAPSULE: 1 CAPSULE, LIQUID FILLED ORAL at 09:35

## 2024-04-19 RX ADMIN — ENOXAPARIN SODIUM 40 MG: 100 INJECTION SUBCUTANEOUS at 09:36

## 2024-04-19 RX ADMIN — INSULIN LISPRO 2 UNITS: 100 INJECTION, SOLUTION INTRAVENOUS; SUBCUTANEOUS at 17:10

## 2024-04-19 RX ADMIN — CARVEDILOL 12.5 MG: 3.12 TABLET, FILM COATED ORAL at 21:22

## 2024-04-19 ASSESSMENT — PAIN SCALES - GENERAL: PAINLEVEL_OUTOF10: 0

## 2024-04-20 LAB
ANION GAP SERPL CALC-SCNC: 7 MMOL/L (ref 5–15)
BACTERIA SPEC CULT: ABNORMAL
BACTERIA SPEC CULT: ABNORMAL
BASOPHILS # BLD: 0 K/UL (ref 0–0.1)
BASOPHILS NFR BLD: 0 % (ref 0–1)
BUN SERPL-MCNC: 11 MG/DL (ref 6–20)
BUN/CREAT SERPL: 12 (ref 12–20)
CALCIUM SERPL-MCNC: 9 MG/DL (ref 8.5–10.1)
CHLORIDE SERPL-SCNC: 109 MMOL/L (ref 97–108)
CO2 SERPL-SCNC: 21 MMOL/L (ref 21–32)
CREAT SERPL-MCNC: 0.95 MG/DL (ref 0.7–1.3)
DIFFERENTIAL METHOD BLD: ABNORMAL
EOSINOPHIL # BLD: 0 K/UL (ref 0–0.4)
EOSINOPHIL NFR BLD: 0 % (ref 0–7)
ERYTHROCYTE [DISTWIDTH] IN BLOOD BY AUTOMATED COUNT: 13.7 % (ref 11.5–14.5)
GLUCOSE BLD STRIP.AUTO-MCNC: 115 MG/DL (ref 65–117)
GLUCOSE BLD STRIP.AUTO-MCNC: 118 MG/DL (ref 65–117)
GLUCOSE BLD STRIP.AUTO-MCNC: 186 MG/DL (ref 65–117)
GLUCOSE BLD STRIP.AUTO-MCNC: 95 MG/DL (ref 65–117)
GLUCOSE SERPL-MCNC: 100 MG/DL (ref 65–100)
GRAM STN SPEC: ABNORMAL
GRAM STN SPEC: ABNORMAL
HCT VFR BLD AUTO: 36.4 % (ref 36.6–50.3)
HGB BLD-MCNC: 12.4 G/DL (ref 12.1–17)
IMM GRANULOCYTES # BLD AUTO: 0.1 K/UL (ref 0–0.04)
IMM GRANULOCYTES NFR BLD AUTO: 0 % (ref 0–0.5)
LYMPHOCYTES # BLD: 1.3 K/UL (ref 0.8–3.5)
LYMPHOCYTES NFR BLD: 8 % (ref 12–49)
MCH RBC QN AUTO: 32.2 PG (ref 26–34)
MCHC RBC AUTO-ENTMCNC: 34.1 G/DL (ref 30–36.5)
MCV RBC AUTO: 94.5 FL (ref 80–99)
MONOCYTES # BLD: 1.3 K/UL (ref 0–1)
MONOCYTES NFR BLD: 8 % (ref 5–13)
NEUTS SEG # BLD: 12.4 K/UL (ref 1.8–8)
NEUTS SEG NFR BLD: 84 % (ref 32–75)
NRBC # BLD: 0 K/UL (ref 0–0.01)
NRBC BLD-RTO: 0 PER 100 WBC
PLATELET # BLD AUTO: 217 K/UL (ref 150–400)
PMV BLD AUTO: 10.3 FL (ref 8.9–12.9)
POTASSIUM SERPL-SCNC: 4.2 MMOL/L (ref 3.5–5.1)
PROCALCITONIN SERPL-MCNC: 0.11 NG/ML
RBC # BLD AUTO: 3.85 M/UL (ref 4.1–5.7)
SERVICE CMNT-IMP: ABNORMAL
SERVICE CMNT-IMP: NORMAL
SERVICE CMNT-IMP: NORMAL
SODIUM SERPL-SCNC: 137 MMOL/L (ref 136–145)
WBC # BLD AUTO: 15.1 K/UL (ref 4.1–11.1)

## 2024-04-20 PROCEDURE — 6370000000 HC RX 637 (ALT 250 FOR IP): Performed by: HOSPITALIST

## 2024-04-20 PROCEDURE — 2580000003 HC RX 258: Performed by: INTERNAL MEDICINE

## 2024-04-20 PROCEDURE — 1100000000 HC RM PRIVATE

## 2024-04-20 PROCEDURE — 82962 GLUCOSE BLOOD TEST: CPT

## 2024-04-20 PROCEDURE — 2580000003 HC RX 258: Performed by: HOSPITALIST

## 2024-04-20 PROCEDURE — 36415 COLL VENOUS BLD VENIPUNCTURE: CPT

## 2024-04-20 PROCEDURE — 84145 PROCALCITONIN (PCT): CPT

## 2024-04-20 PROCEDURE — 85025 COMPLETE CBC W/AUTO DIFF WBC: CPT

## 2024-04-20 PROCEDURE — 0M933ZX DRAINAGE OF RIGHT ELBOW BURSA AND LIGAMENT, PERCUTANEOUS APPROACH, DIAGNOSTIC: ICD-10-PCS | Performed by: ORTHOPAEDIC SURGERY

## 2024-04-20 PROCEDURE — 6370000000 HC RX 637 (ALT 250 FOR IP): Performed by: INTERNAL MEDICINE

## 2024-04-20 PROCEDURE — 6360000002 HC RX W HCPCS: Performed by: INTERNAL MEDICINE

## 2024-04-20 PROCEDURE — APPNB15 APP NON BILLABLE TIME 0-15 MINS: Performed by: PHYSICIAN ASSISTANT

## 2024-04-20 PROCEDURE — 6360000002 HC RX W HCPCS: Performed by: HOSPITALIST

## 2024-04-20 PROCEDURE — 80048 BASIC METABOLIC PNL TOTAL CA: CPT

## 2024-04-20 RX ADMIN — SODIUM CHLORIDE 1250 MG: 9 INJECTION, SOLUTION INTRAVENOUS at 03:33

## 2024-04-20 RX ADMIN — LISINOPRIL 10 MG: 10 TABLET ORAL at 08:35

## 2024-04-20 RX ADMIN — OMEGA-3-ACID ETHYL ESTERS 2 CAPSULE: 1 CAPSULE, LIQUID FILLED ORAL at 08:35

## 2024-04-20 RX ADMIN — ENOXAPARIN SODIUM 40 MG: 100 INJECTION SUBCUTANEOUS at 08:33

## 2024-04-20 RX ADMIN — CARVEDILOL 12.5 MG: 3.12 TABLET, FILM COATED ORAL at 08:34

## 2024-04-20 RX ADMIN — SODIUM CHLORIDE, PRESERVATIVE FREE 10 ML: 5 INJECTION INTRAVENOUS at 08:36

## 2024-04-20 RX ADMIN — CARVEDILOL 12.5 MG: 3.12 TABLET, FILM COATED ORAL at 22:43

## 2024-04-20 RX ADMIN — PIPERACILLIN AND TAZOBACTAM 3375 MG: 3; .375 INJECTION, POWDER, LYOPHILIZED, FOR SOLUTION INTRAVENOUS at 14:30

## 2024-04-20 RX ADMIN — PIPERACILLIN AND TAZOBACTAM 3375 MG: 3; .375 INJECTION, POWDER, LYOPHILIZED, FOR SOLUTION INTRAVENOUS at 05:20

## 2024-04-20 RX ADMIN — AMITRIPTYLINE HYDROCHLORIDE 150 MG: 50 TABLET, FILM COATED ORAL at 22:43

## 2024-04-20 RX ADMIN — ATORVASTATIN CALCIUM 10 MG: 10 TABLET, FILM COATED ORAL at 09:08

## 2024-04-20 RX ADMIN — SODIUM CHLORIDE, PRESERVATIVE FREE 10 ML: 5 INJECTION INTRAVENOUS at 22:46

## 2024-04-20 RX ADMIN — VANCOMYCIN HYDROCHLORIDE 1000 MG: 1 INJECTION, POWDER, LYOPHILIZED, FOR SOLUTION INTRAVENOUS at 18:51

## 2024-04-20 RX ADMIN — OMEGA-3-ACID ETHYL ESTERS 2 CAPSULE: 1 CAPSULE, LIQUID FILLED ORAL at 22:43

## 2024-04-20 RX ADMIN — OXYCODONE AND ACETAMINOPHEN 1 TABLET: 5; 325 TABLET ORAL at 03:37

## 2024-04-20 RX ADMIN — FENOFIBRATE 160 MG: 160 TABLET ORAL at 08:34

## 2024-04-20 RX ADMIN — ASPIRIN 81 MG CHEWABLE TABLET 81 MG: 81 TABLET CHEWABLE at 08:34

## 2024-04-20 ASSESSMENT — PAIN DESCRIPTION - LOCATION: LOCATION: SHOULDER

## 2024-04-20 ASSESSMENT — PAIN DESCRIPTION - DESCRIPTORS: DESCRIPTORS: CRAMPING

## 2024-04-20 ASSESSMENT — PAIN DESCRIPTION - ORIENTATION: ORIENTATION: RIGHT;LEFT

## 2024-04-20 ASSESSMENT — PAIN SCALES - GENERAL: PAINLEVEL_OUTOF10: 5

## 2024-04-20 NOTE — PROCEDURES
PROCEDURE NOTE  Date: 4/20/2024   Name: Josef Mercado  YOB: 1960    Aspiration olecranon bursa    Date/Time: 4/20/2024 10:29 AM    Performed by: Campos Morales MD  Authorized by: Campos Morales MD  Consent: Verbal consent obtained. Written consent not obtained.  Risks and benefits: risks, benefits and alternatives were discussed  Consent given by: patient  Patient understanding: patient states understanding of the procedure being performed  Site marked: the operative site was marked  Patient identity confirmed: verbally with patient and arm band  Indications: pain   Body area: elbow (right olecranon bursa)  Joint: right elbow  Local anesthesia used: no    Anesthesia:  Local anesthesia used: no    Sedation:  Patient sedated: no    Needle size: 20 G  Approach: posterior  Aspirate: blood-tinged  Aspirate amount: 2 mL  Patient tolerance: patient tolerated the procedure well with no immediate complications            Patient appears to be improving on IV abx.  Aspirated olecranon bursa of small amount of fluid.  Do not anticipate surgical management.  Cont iv abx.  Hopefully home tomorrow on oral abx in 1-2 days if continues to improve.

## 2024-04-21 VITALS
HEIGHT: 68 IN | RESPIRATION RATE: 17 BRPM | TEMPERATURE: 97.7 F | SYSTOLIC BLOOD PRESSURE: 133 MMHG | BODY MASS INDEX: 27.26 KG/M2 | DIASTOLIC BLOOD PRESSURE: 84 MMHG | OXYGEN SATURATION: 95 % | HEART RATE: 88 BPM | WEIGHT: 179.9 LBS

## 2024-04-21 PROBLEM — M71.121 SEPTIC OLECRANON BURSITIS OF RIGHT ELBOW: Status: RESOLVED | Noted: 2024-04-18 | Resolved: 2024-04-21

## 2024-04-21 PROBLEM — A41.9 SEPSIS (HCC): Status: RESOLVED | Noted: 2024-04-17 | Resolved: 2024-04-21

## 2024-04-21 LAB
ALBUMIN SERPL-MCNC: 2.6 G/DL (ref 3.5–5)
ANION GAP SERPL CALC-SCNC: 6 MMOL/L (ref 5–15)
BACTERIA SPEC CULT: NORMAL
BACTERIA SPEC CULT: NORMAL
BASOPHILS # BLD: 0 K/UL (ref 0–0.1)
BASOPHILS NFR BLD: 0 % (ref 0–1)
BUN SERPL-MCNC: 10 MG/DL (ref 6–20)
BUN/CREAT SERPL: 11 (ref 12–20)
CALCIUM SERPL-MCNC: 8.8 MG/DL (ref 8.5–10.1)
CHLORIDE SERPL-SCNC: 108 MMOL/L (ref 97–108)
CO2 SERPL-SCNC: 23 MMOL/L (ref 21–32)
COMMENT:: NORMAL
CREAT SERPL-MCNC: 0.88 MG/DL (ref 0.7–1.3)
DIFFERENTIAL METHOD BLD: ABNORMAL
EOSINOPHIL # BLD: 0.1 K/UL (ref 0–0.4)
EOSINOPHIL NFR BLD: 1 % (ref 0–7)
ERYTHROCYTE [DISTWIDTH] IN BLOOD BY AUTOMATED COUNT: 13.2 % (ref 11.5–14.5)
GLUCOSE BLD STRIP.AUTO-MCNC: 117 MG/DL (ref 65–117)
GLUCOSE BLD STRIP.AUTO-MCNC: 118 MG/DL (ref 65–117)
GLUCOSE SERPL-MCNC: 114 MG/DL (ref 65–100)
HCT VFR BLD AUTO: 37.2 % (ref 36.6–50.3)
HGB BLD-MCNC: 12.4 G/DL (ref 12.1–17)
IMM GRANULOCYTES # BLD AUTO: 0.1 K/UL (ref 0–0.04)
IMM GRANULOCYTES NFR BLD AUTO: 1 % (ref 0–0.5)
LYMPHOCYTES # BLD: 1.1 K/UL (ref 0.8–3.5)
LYMPHOCYTES NFR BLD: 9 % (ref 12–49)
MCH RBC QN AUTO: 31.4 PG (ref 26–34)
MCHC RBC AUTO-ENTMCNC: 33.3 G/DL (ref 30–36.5)
MCV RBC AUTO: 94.2 FL (ref 80–99)
MONOCYTES # BLD: 1.2 K/UL (ref 0–1)
MONOCYTES NFR BLD: 9 % (ref 5–13)
NEUTS SEG # BLD: 10.2 K/UL (ref 1.8–8)
NEUTS SEG NFR BLD: 80 % (ref 32–75)
NRBC # BLD: 0 K/UL (ref 0–0.01)
NRBC BLD-RTO: 0 PER 100 WBC
PHOSPHATE SERPL-MCNC: 2.1 MG/DL (ref 2.6–4.7)
PLATELET # BLD AUTO: 271 K/UL (ref 150–400)
PMV BLD AUTO: 10.2 FL (ref 8.9–12.9)
POTASSIUM SERPL-SCNC: 3.6 MMOL/L (ref 3.5–5.1)
RBC # BLD AUTO: 3.95 M/UL (ref 4.1–5.7)
SERVICE CMNT-IMP: ABNORMAL
SERVICE CMNT-IMP: NORMAL
SERVICE CMNT-IMP: NORMAL
SODIUM SERPL-SCNC: 137 MMOL/L (ref 136–145)
SPECIMEN HOLD: NORMAL
WBC # BLD AUTO: 12.6 K/UL (ref 4.1–11.1)

## 2024-04-21 PROCEDURE — 80069 RENAL FUNCTION PANEL: CPT

## 2024-04-21 PROCEDURE — 6370000000 HC RX 637 (ALT 250 FOR IP): Performed by: INTERNAL MEDICINE

## 2024-04-21 PROCEDURE — APPNB15 APP NON BILLABLE TIME 0-15 MINS: Performed by: PHYSICIAN ASSISTANT

## 2024-04-21 PROCEDURE — 6360000002 HC RX W HCPCS: Performed by: INTERNAL MEDICINE

## 2024-04-21 PROCEDURE — 85025 COMPLETE CBC W/AUTO DIFF WBC: CPT

## 2024-04-21 PROCEDURE — 2580000003 HC RX 258: Performed by: INTERNAL MEDICINE

## 2024-04-21 PROCEDURE — 6370000000 HC RX 637 (ALT 250 FOR IP): Performed by: HOSPITALIST

## 2024-04-21 PROCEDURE — 82962 GLUCOSE BLOOD TEST: CPT

## 2024-04-21 PROCEDURE — 36415 COLL VENOUS BLD VENIPUNCTURE: CPT

## 2024-04-21 RX ORDER — DOXYCYCLINE HYCLATE 100 MG
100 TABLET ORAL EVERY 12 HOURS SCHEDULED
Status: DISCONTINUED | OUTPATIENT
Start: 2024-04-21 | End: 2024-04-21 | Stop reason: HOSPADM

## 2024-04-21 RX ORDER — DOXYCYCLINE HYCLATE 100 MG
100 TABLET ORAL EVERY 12 HOURS SCHEDULED
Qty: 20 TABLET | Refills: 0 | Status: SHIPPED | OUTPATIENT
Start: 2024-04-21 | End: 2024-05-01

## 2024-04-21 RX ORDER — CARVEDILOL 12.5 MG/1
12.5 TABLET ORAL 2 TIMES DAILY
Qty: 60 TABLET | Refills: 0 | Status: SHIPPED | OUTPATIENT
Start: 2024-04-21

## 2024-04-21 RX ADMIN — LISINOPRIL 10 MG: 10 TABLET ORAL at 10:46

## 2024-04-21 RX ADMIN — ENOXAPARIN SODIUM 40 MG: 100 INJECTION SUBCUTANEOUS at 10:24

## 2024-04-21 RX ADMIN — OMEGA-3-ACID ETHYL ESTERS 2 CAPSULE: 1 CAPSULE, LIQUID FILLED ORAL at 10:44

## 2024-04-21 RX ADMIN — SODIUM CHLORIDE, PRESERVATIVE FREE 10 ML: 5 INJECTION INTRAVENOUS at 10:45

## 2024-04-21 RX ADMIN — ASPIRIN 81 MG CHEWABLE TABLET 81 MG: 81 TABLET CHEWABLE at 10:43

## 2024-04-21 RX ADMIN — DOXYCYCLINE HYCLATE 100 MG: 100 TABLET, COATED ORAL at 12:45

## 2024-04-21 RX ADMIN — CARVEDILOL 12.5 MG: 3.12 TABLET, FILM COATED ORAL at 10:44

## 2024-04-21 RX ADMIN — VANCOMYCIN HYDROCHLORIDE 1000 MG: 1 INJECTION, POWDER, LYOPHILIZED, FOR SOLUTION INTRAVENOUS at 07:25

## 2024-04-21 RX ADMIN — OXYCODONE AND ACETAMINOPHEN 1 TABLET: 5; 325 TABLET ORAL at 04:10

## 2024-04-21 RX ADMIN — FENOFIBRATE 160 MG: 160 TABLET ORAL at 10:25

## 2024-04-21 RX ADMIN — PIPERACILLIN AND TAZOBACTAM 3375 MG: 3; .375 INJECTION, POWDER, LYOPHILIZED, FOR SOLUTION INTRAVENOUS at 00:38

## 2024-04-21 RX ADMIN — ATORVASTATIN CALCIUM 10 MG: 10 TABLET, FILM COATED ORAL at 10:44

## 2024-04-21 ASSESSMENT — PAIN DESCRIPTION - DESCRIPTORS: DESCRIPTORS: ACHING

## 2024-04-21 ASSESSMENT — PAIN DESCRIPTION - LOCATION: LOCATION: ELBOW

## 2024-04-21 ASSESSMENT — PAIN SCALES - GENERAL
PAINLEVEL_OUTOF10: 7
PAINLEVEL_OUTOF10: 6

## 2024-04-21 ASSESSMENT — PAIN DESCRIPTION - ORIENTATION: ORIENTATION: RIGHT

## 2024-04-21 NOTE — DISCHARGE INSTRUCTIONS
Followup with pcp and orthopedic outpatient service. This is to include clinical course and final fluid and blood culture results.

## 2024-04-21 NOTE — CARE COORDINATION
Care Management Initial Assessment       RUR:not determined  Readmission? No  1st IM letter given? Yes   2nd letter - 4/21/24  1st  letter given: No  Patient has [employer] Medicare    Admission  Stepticemia  / right elbow olecranon Bursitis  Aspiration olecranon bursa  4/20/24    Received IV abx  Discharge today on oral abx    Disposition  Home with wife and son  Transportation  Family  [facility] accepted  Medical follow up  PCP and specialist  Contact  Wife  Citlalli Hollis [phone]    CM met with patient in his room -- He confirmed demographics, PCP and insurance-- Lives in multi level home with wife and son.  He is wheelchair bound due to (spinocerebellar ataxia).  He receives assistance with adl's and iadl's as needed by his wife / son.  He said he does still drive on occasion but  his wife or son usually transport-  There is a ramp to enter the home.    Family to transport home    Patient in agreement with home health-- referrals sent to [facility] and [facility] as the agencies accept patient's insurance-- [employer] medicare  Waiting for response.      UPDATE 1:15 pm  [facility] accepted  Name and number of agency placed on AVS     04/21/24 7782   Service Assessment   Patient Orientation Alert and Oriented   Cognition Alert   History Provided By Patient   Primary Caregiver Self   Support Systems Spouse/Significant Other;Children   Patient's Healthcare Decision Maker is: Legal Next of Kin   PCP Verified by CM Yes   Last Visit to PCP Within last 3 months   Prior Functional Level Independent in ADLs/IADLs   Current Functional Level Independent in ADLs/IADLs   Can patient return to prior living arrangement Yes   Ability to make needs known: Good   Family able to assist with home care needs: Yes   Would you like for me to discuss the discharge plan with any other family members/significant others, and if so, who? No   Financial Resources Medicare   Community Resources None

## 2024-04-21 NOTE — PROGRESS NOTES
[facility]                                                                                          Hospitalist Progress Note  Aubrie Tejeda MD  Office Phone: [phone]        Date of Service:  2024  NAME:  Josef Mercado  :  1960  MRN:  [id number]       Admission Summary:   Quoted: \"Josef Mercado is a 63 y.o. male with past history significant for CAD, type 2 diabetes, ARY, dyslipidemia, spinocerebellar ataxia presented at the emergency room with swelling and pain right elbow.  Patient fell few days ago sustained injury to his right elbow.  The elbow became swollen and red associated with pain.  The pain is constant, worse with movement of the elbow, no radiation, slight relief at rest, 6/10 in severity and constant.  Patient follow-up with the Ortho on-call today and was noted to have septic olecranon bursitis.  He was sent to the emergency room for admission for intravenous antibiotics.  He presented at the emergency room with leukocytosis, elevated lactic acid level and tachycardia raising concern for sepsis.  Code sepsis was called in the emergency room, he received fluid as per sepsis protocol, started on Ancef and referred to the hospitalist service for admission.\"       Interval history / Subjective:   Feels better, no subjective fever, he reports less swelling.     Assessment & Plan:     Sepsis likely coming from septic olecranon bursitis of right elbow  Lactic acidosis POA (refer to prior hospitalist note)  -s/p I&D   -body fluid cultures staph aureus  -blood cx ngtd  -on Vanc/Zosyn   : Ortho service was to resume IV abx for now    Hyponatremia now resolved  Type 2 diabetes:SSI  Dyslipidemia: We will continue fenofibrate and Lipitor.  Essential hypertension: Continue home meds  Tobacco abuse: Nicotine patch, counseled  Spinocerebellar ataxia: PT/OT, supportive     Regular diet    Code status: FULL CODE  Prophylaxis: Lovenox    Plan: 
        [facility]                                                                                          Hospitalist Progress Note  Jelani Oseguera MD  Office Phone: [phone]        Date of Service:  2024  NAME:  Josef Mercado  :  1960  MRN:  [id number]       Admission Summary:   Josef Mercado is a 63 y.o. male with past history significant for CAD, type 2 diabetes, ARY, dyslipidemia, spinocerebellar ataxia presented at the emergency room with swelling and pain right elbow.  Patient fell few days ago sustained injury to his right elbow.  The elbow became swollen and red associated with pain.  The pain is constant, worse with movement of the elbow, no radiation, slight relief at rest, 6/10 in severity and constant.  Patient follow-up with the Ortho on-call today and was noted to have septic olecranon bursitis.  He was sent to the emergency room for admission for intravenous antibiotics.  He presented at the emergency room with leukocytosis, elevated lactic acid level and tachycardia raising concern for sepsis.  Code sepsis was called in the emergency room, he received fluid as per sepsis protocol, started on Ancef and referred to the hospitalist service for admission.       Interval history / Subjective:   Follow up Sepsis   Tachycardia improving  Assessment & Plan:     Sepsis  likely coming from septic olecranon bursitis of right elbow  Lactic acidosis  -s/p I&D   -follow cultures  -on Vanc/Zosyn  -appreciate Ortho    Hyponatremia now resolved  Type 2 diabetes:SSI  Dyslipidemia: We will continue fenofibrate and Lipitor.  Essential hypertension: Continue home meds  Tobacco abuse: Nicotine patch, counseled  Spinocerebellar ataxia: PT/OT, supportive     Regular diet    Code status: FULL CODE  Prophylaxis: Lovenox    Plan: Follow cultures  Care Plan discussed with: patient, RN, CM  Anticipated Disposition: discharge to home 1-2 days  Inpatient  Cardiac 
Duplicate PT orders acknowledged, pt already on PT caseload and will be seen per frequency on 4/22.     Zan Avendano, PT    
End of Shift Note    Bedside shift change report given to Roberto BEVERLY (oncoming nurse) by Day Aaron LPN (offgoing nurse).  Report included the following information SBAR    Shift worked:  0730-2000     Shift summary and any significant changes:    Room air  Tolerated medication  Up to bathroom with assist  Urinal   Concerns for physician to address:  None       Activity:     Number times ambulated in hallways past shift: 0  Number of times OOB to chair past shift: 0    Cardiac:   Cardiac Monitoring: No           Access:  Current line(s): PIV     Genitourinary:   Urinary status: voiding    Respiratory:      Chronic home O2 use?: NO  Incentive spirometer at bedside: YES       GI:     Current diet:  ADULT DIET; Regular; 3 carb choices (45 gm/meal)  Passing flatus: YES  Tolerating current diet: YES       Pain Management:   Patient states pain is manageable on current regimen: YES    Skin:     Interventions: increase time out of bed, PT/OT consult, and nutritional support    Patient Safety:  Fall Score:    Interventions: bed/chair alarm, assistive device (walker, cane. etc), gripper socks, and pt to call before getting OOB       Length of Stay:  Expected LOS: 3  Actual LOS: 3      Day Aaron LPN                           
Occupational Therapy  4/18/2024    Chart reviewed and orders acknowledged. Attempted to see patient for OT evaluation, received resting supine in bed. Patient educated on role of OT in acute setting, provided with encouragement for participation however politely declining services at this time due to fatigue and increased pain levels following RUE bursa aspiration. Will defer at this time and follow up tomorrow for evaluation as appropriate.     Sandrita Saini, OTD, OTR/L    
Orthopedic Progress Note    S: Pain and swelling are improved per the patient;Denies numbness, tingling, focal weakness, cp, sob, fever, chills    O: NAD, respirations unlabored; Dressings CDI, removed with there is no drainage; mild swelling and erythema surrounding the elbow with mild ttp, pain at extremes of range; mild fluctuance; NVI    Patient Vitals for the past 4 hrs:   Pulse   04/19/24 1000 93     Recent Labs     04/17/24  2114 04/18/24  0400 04/19/24  0501   HGB 14.8 13.8  --    HCT 43.0 40.6  --     230  --    BUN 18 13 10   K 3.9 3.6 3.3*   * 138 136       XR CHEST PORTABLE  Narrative: EXAM:  XR CHEST PORTABLE    INDICATION: Sepsis, evaluate for pneumonia     COMPARISON:  film CT 4/15/2017 and chest radiograph 10/3/2013    TECHNIQUE: 0828 hours portable chest AP view    FINDINGS: The cardiac silhouette is within normal limits. The pulmonary  vasculature is within normal limits.     The lungs and pleural spaces are clear. The visualized bones and upper abdomen  are age-appropriate.  Impression: No acute process on portable chest.       A/P:  - Continue IV Abx for another day and continue to monitor for improvement; still a possibility he requires surgery, but aspiration and abx may suffice if he continues to improve.     - Elevate, Ace wrap for compression; APAP, Oxy prn pain.     RACHEL Flor  Orthopedic Trauma Service  [facility]  
Orthopedic Progress Note    S: Pain improved, ROM improved per patient; no new complaints; Denies numbness, tingling, focal weakness, cp, sob, fever, chills    O: NAD, respirations unlabored; Dressings CDI, scant purulent drainage today,mild swelling and erythema surrounding the elbow with mild ttp, pain at extremes of range; mild fluctuance; NVI     No data found.  Recent Labs     04/18/24  0400 04/19/24  0501 04/20/24  0303   HGB 13.8  --  12.4   HCT 40.6  --  36.4*     --  217   BUN 13 10 11   K 3.6 3.3* 4.2    136 137       XR CHEST PORTABLE  Narrative: EXAM:  XR CHEST PORTABLE    INDICATION: Sepsis, evaluate for pneumonia     COMPARISON:  film CT 4/15/2017 and chest radiograph 10/3/2013    TECHNIQUE: 0828 hours portable chest AP view    FINDINGS: The cardiac silhouette is within normal limits. The pulmonary  vasculature is within normal limits.     The lungs and pleural spaces are clear. The visualized bones and upper abdomen  are age-appropriate.  Impression: No acute process on portable chest.       A/P:  - Not much improvement from yesterday; surgeon evaluated and will re-aspirate today; continue ABX and monitor; possibly home tomorrow on Orals if WBC continue to down trend and there is no worsening.   - Dry sterile dressings, Ace wrap, elevated; APAP, Oxy prn.   .    RACHEL Flor  Orthopedic Trauma Service  [facility]  
Orthopedic Progress Note    S: Pain, swelling, ROM all improved, no new complaints; Denies numbness, tingling, focal weakness, cp, sob, fever, chills    O:NAD, respirations unlabored; Scant drainage on dressing today,mild swelling and erythema surrounding the elbow with mild ttp, pain at extremes of range; no fluctuance; NVI     No data found.  Recent Labs     04/20/24  0303 04/21/24  0428   HGB 12.4 12.4   HCT 36.4* 37.2    271   BUN 11 10   K 4.2 3.6    137       XR CHEST PORTABLE  Narrative: EXAM:  XR CHEST PORTABLE    INDICATION: Sepsis, evaluate for pneumonia     COMPARISON:  film CT 4/15/2017 and chest radiograph 10/3/2013    TECHNIQUE: 0828 hours portable chest AP view    FINDINGS: The cardiac silhouette is within normal limits. The pulmonary  vasculature is within normal limits.     The lungs and pleural spaces are clear. The visualized bones and upper abdomen  are age-appropriate.  Impression: No acute process on portable chest.       A/P:  - Significant improvement form yesterday; WBC continue to trend down.   - Okay for discharge on Oral Abx; No restrictions, keep wound covered with sterile dressing, protect elbow from direct pressure; Make appointment to be re-evaluated later this week with Dr. Morales.     RACHEL Flor  Orthopedic Trauma Service  [facility]  
Pharmacist Note - Vancomycin Dosing    Consult provided for this 63 y.o. male for indication of sepsis.  Antibiotic regimen(s): Vancomycin and Zosyn  Patient on vancomycin PTA? NO     Recent Labs     24  2114 24  0400   WBC 13.6* 16.2*   CREATININE 1.00  --    BUN 18  --      Frequency of BMP: daily  Height: 172.7 cm  Weight: 81.6 kg  Est CrCl: ~71 ml/min  Temp (24hrs), Av.3 °F (36.8 °C), Min:97.9 °F (36.6 °C), Max:98.8 °F (37.1 °C)    Cultures:   Blood:  pending    MRSA Swab ordered (if applicable)? YES    The plan below is expected to result in a target range of AUC/-600    Therapy will be initiated with a loading dose of 2000 mg IV x 1 to be followed by a maintenance dose of 1000 mg IV every 12 hours.  Pharmacy to follow patient daily and order levels / make dose adjustments as appropriate.    *Vancomycin has been dosed used Bayesian kinetics software to target an AUC/RAD of 400-600, which provides adequate exposure for an assumed infection due to MRSA with an RAD of 1 or less while reducing the risk of nephrotoxicity as seen with traditional trough based dosing goals.     
Pharmacist Note - Vancomycin Dosing  Therapy day 2  Indication: sepsis - septic olecranon bursitis of the right elbow   Current regimen: 1000 mg IV q12h    Recent Labs     04/17/24  2114 04/18/24  0400 04/19/24  0501   WBC 13.6* 16.2*  --    CREATININE 1.00 0.79 0.81   BUN 18 13 10       A random vancomycin level of 7 mcg/mL was obtained and from this level, the patient's AUC24 is calculated to be 404 with the current regimen.     Goal target range AUC/-600      Plan: Change to 1250 mg IV q12h for an anticipated AUC of 499 . Pharmacy will continue to monitor this patient daily for changes in clinical status and renal function.    *Random vancomycin levels are used to calculate AUC/RAD, this level should not be interpreted as a trough. Vancomycin has been dosed using Bayesian kinetics software to target an AUC24:RAD of 400-600, which provides adequate exposure for as assumed infection due to MRSA with an RAD of 1 or less while reducing the risk of nephrotoxicity as seen with traditional trough based dosing goals.    
mucus membrane, TM clear  Neck: supple, no JVD, no meningeal signs  Chest: Clear to auscultation bilaterally   CVS: S1 S2 heard, Capillary refill less than 2 seconds  Abd: soft/ non tender, non distended, BS physiological,   Ext: no clubbing, no cyanosis, no edema, brisk 2+ DP pulses  Neuro/Psych: pleasant mood and affect, CN 2-12 grossly intact, sensory grossly within normal limit, Strength 5/5 in all extremities, DTR 1+ x 4  Skin: warm     Data Review:    Review and/or order of clinical lab test      I have personally and independently reviewed all pertinent labs, diagnostic studies, imaging, and have provided independent interpretation of the same.     Labs:     Recent Labs     04/17/24 2114 04/18/24  0400   WBC 13.6* 16.2*   HGB 14.8 13.8   HCT 43.0 40.6    230       Recent Labs     04/17/24 2114 04/18/24 0400 04/19/24  0501   * 138 136   K 3.9 3.6 3.3*    111* 109*   CO2 24 20* 23   BUN 18 13 10   MG  --  1.6  --    PHOS  --  2.3*  --        Recent Labs     04/17/24 2114 04/18/24  0400   ALT 20 20   GLOB 4.1* 3.4       No results for input(s): \"INR\", \"APTT\" in the last 72 hours.    Invalid input(s): \"PTP\"   No results for input(s): \"TIBC\", \"FERR\" in the last 72 hours.    Invalid input(s): \"FE\", \"PSAT\"   No results found for: \"FOL\", \"RBCF\"   No results for input(s): \"PH\", \"PCO2\", \"PO2\" in the last 72 hours.  No results for input(s): \"CPK\" in the last 72 hours.    Invalid input(s): \"CPKMB\", \"CKNDX\", \"TROIQ\"  No results found for: \"CHOL\", \"CHOLX\", \"CHLST\", \"CHOLV\", \"HDL\", \"HDLC\", \"LDL\", \"LDLC\", \"TGLX\", \"TRIGL\"  No results found for: \"GLUCPOC\"  [unfilled]    Notes reviewed from all clinical/nonclinical/nursing services involved in patient's clinical care. Care coordination discussions were held with appropriate clinical/nonclinical/ nursing providers based on care coordination needs.         Patients current active Medications were reviewed, considered, added and adjusted based on the clinical

## 2024-04-21 NOTE — DISCHARGE SUMMARY
erythema around area of dressing of right elbow  Psych:             Not anxious or agitated.  Neurologic:      Alert, moves all extremities, answers questions appropriately and responds to commands       CHRONIC MEDICAL DIAGNOSES:      Greater than 30 minutes were spent with the patient on counseling and coordination of care    Signed:   Aubrie Tejeda MD  4/21/2024  11:29 AM

## 2024-04-21 NOTE — PLAN OF CARE
Problem: Discharge Planning  Goal: Discharge to home or other facility with appropriate resources  4/21/2024 0948 by Roberto Anton LPN  Outcome: Progressing  4/20/2024 2118 by Day Aaron LPN  Outcome: Progressing     Problem: Pain  Goal: Verbalizes/displays adequate comfort level or baseline comfort level  4/21/2024 0948 by Roberto Anton LPN  Outcome: Progressing  4/20/2024 2118 by Day Aaron LPN  Outcome: Progressing     Problem: Safety - Adult  Goal: Free from fall injury  4/21/2024 0948 by Roberto Anton LPN  Outcome: Progressing  4/20/2024 2118 by Day Aaron LPN  Outcome: Progressing     Problem: Skin/Tissue Integrity  Goal: Absence of new skin breakdown  Description: 1.  Monitor for areas of redness and/or skin breakdown  2.  Assess vascular access sites hourly  3.  Every 4-6 hours minimum:  Change oxygen saturation probe site  4.  Every 4-6 hours:  If on nasal continuous positive airway pressure, respiratory therapy assess nares and determine need for appliance change or resting period.  4/21/2024 0948 by Roberto Anton LPN  Outcome: Progressing  4/20/2024 2118 by Day Aaron LPN  Outcome: Progressing     Problem: Chronic Conditions and Co-morbidities  Goal: Patient's chronic conditions and co-morbidity symptoms are monitored and maintained or improved  4/21/2024 0948 by Roberto Anton LPN  Outcome: Progressing  4/20/2024 2118 by Day Aaron LPN  Outcome: Progressing     
  Problem: Discharge Planning  Goal: Discharge to home or other facility with appropriate resources  4/21/2024 1321 by Jayshree Carr LPN  Outcome: Not Progressing  4/21/2024 0948 by Roberto Anton LPN  Outcome: Progressing     Problem: Safety - Adult  Goal: Free from fall injury  4/21/2024 1321 by Jayshree Carr LPN  Outcome: Not Progressing  Flowsheets (Taken 4/21/2024 1000)  Free From Fall Injury: Instruct family/caregiver on patient safety  4/21/2024 0948 by Roberto Anton LPN  Outcome: Progressing     
  Problem: Discharge Planning  Goal: Discharge to home or other facility with appropriate resources  Outcome: Progressing  Flowsheets (Taken 4/18/2024 0258)  Discharge to home or other facility with appropriate resources:   Identify barriers to discharge with patient and caregiver   Arrange for needed discharge resources and transportation as appropriate   Identify discharge learning needs (meds, wound care, etc)     Problem: Pain  Goal: Verbalizes/displays adequate comfort level or baseline comfort level  Outcome: Progressing  Flowsheets (Taken 4/18/2024 0258)  Verbalizes/displays adequate comfort level or baseline comfort level:   Encourage patient to monitor pain and request assistance   Assess pain using appropriate pain scale   Administer analgesics based on type and severity of pain and evaluate response   Implement non-pharmacological measures as appropriate and evaluate response     Problem: Safety - Adult  Goal: Free from fall injury  Outcome: Progressing     Problem: Skin/Tissue Integrity  Goal: Absence of new skin breakdown  Description: 1.  Monitor for areas of redness and/or skin breakdown  2.  Assess vascular access sites hourly  3.  Every 4-6 hours minimum:  Change oxygen saturation probe site  4.  Every 4-6 hours:  If on nasal continuous positive airway pressure, respiratory therapy assess nares and determine need for appliance change or resting period.  Outcome: Progressing     
  Problem: Pain  Goal: Verbalizes/displays adequate comfort level or baseline comfort level  Outcome: Progressing  Flowsheets (Taken 4/19/2024 1135)  Verbalizes/displays adequate comfort level or baseline comfort level:   Encourage patient to monitor pain and request assistance   Administer analgesics based on type and severity of pain and evaluate response   Consider cultural and social influences on pain and pain management   Assess pain using appropriate pain scale   Implement non-pharmacological measures as appropriate and evaluate response   Notify Licensed Independent Practitioner if interventions unsuccessful or patient reports new pain     
  Problem: Physical Therapy - Adult  Goal: By Discharge: Performs mobility at highest level of function for planned discharge setting.  See evaluation for individualized goals.  Description: FUNCTIONAL STATUS PRIOR TO ADMISSION: The patient was functional at the wheelchair level and was supervisionfor transfers to the chair.    HOME SUPPORT PRIOR TO ADMISSION: The patient lived with wife and son who assist with adls.    Physical Therapy Goals  Initiated 4/18/2024    2.  Patient will perform sit to stand with contact guard assist within 7 day(s).  3.  Patient will transfer from bed to chair and chair to bed with contact guard assist using the least restrictive device within 7 day(s).  4.  Patient will ambulate with minimal assistance for 75 feet with the least restrictive device within 7 day(s).     Outcome: Progressing         PHYSICAL THERAPY EVALUATION    Patient: Josef Mercado (63 y.o. 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 Now s/p aspiration  DEFICITS/IMPAIRMENTS:   The patient is limited by decreased functional mobility, coordination, balance who would benefit from PT to address these impairments. 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 His wife and son assist him for adls and some transfers. Patient stated that he was near his functional baseline- needed slight increased assist secondary to right upper ext discomfort, but patient stated that his family would be able to provide enough assist at home and had no concerns.    Patient needed increased assist with sit to stand from 
  Problem: Physical Therapy - Adult  Goal: By Discharge: Performs mobility at highest level of function for planned discharge setting.  See evaluation for individualized goals.  Description: FUNCTIONAL STATUS PRIOR TO ADMISSION: The patient was functional at the wheelchair level and was supervisionfor transfers to the chair.    HOME SUPPORT PRIOR TO ADMISSION: The patient lived with wife and son who assist with adls.    Physical Therapy Goals  Initiated 4/18/2024    2.  Patient will perform sit to stand with contact guard assist within 7 day(s).  3.  Patient will transfer from bed to chair and chair to bed with contact guard assist using the least restrictive device within 7 day(s).  4.  Patient will ambulate with minimal assistance for 75 feet with the least restrictive device within 7 day(s).     Outcome: Progressing   PHYSICAL THERAPY TREATMENT    Patient: Josef Mercado (63 y.o. male)  Date: 4/19/2024  Diagnosis: Septicemia (HCC) [A41.9]  Sepsis (HCC) [A41.9]  Septic olecranon bursitis of right elbow [M71.121] Sepsis (HCC)      Precautions: Fall Risk                alarm      ASSESSMENT:  Patient continues to benefit from skilled PT services and is slowly progressing towards goals. He remains limited by ataxia, impaired sitting balance, impaired standing balance, and decreased use of his RUE. Upon supine to sit note significant loss of balance with assist required to get fully to the EOB and feet on the floor to be able to then manage his sitting balance without assist. Practiced bed to chair tranfers. Initially completed with the chair beside the bed which required mod assist. Then practiced with the chair set up forward facing the bed and pt was able to complete with min assist. Continue to recommend HHPT         PLAN:  Patient continues to benefit from skilled intervention to address the above impairments.  Continue treatment per established plan of care.    Recommend with staff: up to the chair with technique 
progress to the bathroom as no portable telebox available.  Pt is increase risk for tipping a BSC due to spinocerebellar ataxia at baseline.  He does transfer to the toilet at home using a grab bar.  Will continue to progress ADL activities as appropriate and able during hospital course.   .    Based on the impairments listed above anticipate home when medically cleared.    Functional Outcome Measure:  The patient scored 15 on the [facility] outcome measure.         PLAN :  Recommendations and Planned Interventions:   self care training, therapeutic activities, functional mobility training, balance training, therapeutic exercise, endurance activities, patient education, home safety training, and family training/education    Frequency/Duration: OT Plan of Care: 5 times/week    Recommendation for discharge: (in order for the patient to meet his/her long term goals): No skilled occupational therapy    Other factors to consider for discharge: no additional factors    IF patient discharges home will need the following DME: none       SUBJECTIVE:   Patient stated, “It is feeling a little better today.”  OBJECTIVE DATA SUMMARY:     Past Medical History:   Diagnosis Date    Arthritis     thumbs, bilateral    CAD (coronary artery disease) 04/14/2017    Diabetes (HCC)     GERD (gastroesophageal reflux disease)     occasionally    Myocardial infarction (HCC) 04/14/2017    Sleep apnea      Past Surgical History:   Procedure Laterality Date    CARDIAC CATHETERIZATION  04/14/2017    2 stent    CORONARY ANGIOPLASTY WITH STENT PLACEMENT      HEENT      wisdom teeth removed    PTCA      VASECTOMY            Expanded or extensive additional review of patient history:   Social/Functional History  Lives With: Spouse, Son  Type of Home: House  Home Layout: Multi-level  Home Access: Ramped entrance  Bathroom Shower/Tub: Walk-in shower, Shower chair with back  Bathroom Equipment: Grab bars in shower, Built-in shower seat, 3-in-1 commode  Home

## 2024-04-22 LAB
BACTERIA SPEC CULT: NORMAL
BACTERIA SPEC CULT: NORMAL
SERVICE CMNT-IMP: NORMAL
SERVICE CMNT-IMP: NORMAL

## 2025-05-23 ENCOUNTER — TRANSCRIBE ORDERS (OUTPATIENT)
Facility: HOSPITAL | Age: 65
End: 2025-05-23

## 2025-05-23 DIAGNOSIS — R11.0 NAUSEA: Primary | ICD-10-CM

## 2025-06-13 ENCOUNTER — HOSPITAL ENCOUNTER (OUTPATIENT)
Facility: HOSPITAL | Age: 65
Discharge: HOME | End: 2025-06-16
Payer: MEDICARE

## 2025-06-13 DIAGNOSIS — R11.0 NAUSEA: ICD-10-CM

## 2025-06-13 PROCEDURE — 76705 ECHO EXAM OF ABDOMEN: CPT
